# Patient Record
Sex: MALE | Race: WHITE | NOT HISPANIC OR LATINO | Employment: FULL TIME | URBAN - METROPOLITAN AREA
[De-identification: names, ages, dates, MRNs, and addresses within clinical notes are randomized per-mention and may not be internally consistent; named-entity substitution may affect disease eponyms.]

---

## 2018-10-18 ENCOUNTER — OFFICE VISIT CONVERTED (OUTPATIENT)
Dept: ORTHOPEDIC SURGERY | Facility: CLINIC | Age: 53
End: 2018-10-18
Attending: PHYSICIAN ASSISTANT

## 2018-11-01 ENCOUNTER — OFFICE VISIT CONVERTED (OUTPATIENT)
Dept: ORTHOPEDIC SURGERY | Facility: CLINIC | Age: 53
End: 2018-11-01
Attending: ORTHOPAEDIC SURGERY

## 2018-11-19 ENCOUNTER — OFFICE VISIT CONVERTED (OUTPATIENT)
Dept: ORTHOPEDIC SURGERY | Facility: CLINIC | Age: 53
End: 2018-11-19
Attending: PHYSICIAN ASSISTANT

## 2018-12-10 ENCOUNTER — OFFICE VISIT CONVERTED (OUTPATIENT)
Dept: ORTHOPEDIC SURGERY | Facility: CLINIC | Age: 53
End: 2018-12-10
Attending: PHYSICIAN ASSISTANT

## 2019-01-07 ENCOUNTER — OFFICE VISIT CONVERTED (OUTPATIENT)
Dept: ORTHOPEDIC SURGERY | Facility: CLINIC | Age: 54
End: 2019-01-07
Attending: PHYSICIAN ASSISTANT

## 2019-02-05 ENCOUNTER — OFFICE VISIT CONVERTED (OUTPATIENT)
Dept: ORTHOPEDIC SURGERY | Facility: CLINIC | Age: 54
End: 2019-02-05
Attending: ORTHOPAEDIC SURGERY

## 2019-03-27 ENCOUNTER — OFFICE VISIT CONVERTED (OUTPATIENT)
Dept: ORTHOPEDIC SURGERY | Facility: CLINIC | Age: 54
End: 2019-03-27
Attending: ORTHOPAEDIC SURGERY

## 2019-05-14 ENCOUNTER — OFFICE VISIT CONVERTED (OUTPATIENT)
Dept: ORTHOPEDIC SURGERY | Facility: CLINIC | Age: 54
End: 2019-05-14
Attending: ORTHOPAEDIC SURGERY

## 2020-01-27 ENCOUNTER — OFFICE VISIT CONVERTED (OUTPATIENT)
Dept: FAMILY MEDICINE CLINIC | Facility: CLINIC | Age: 55
End: 2020-01-27
Attending: PHYSICIAN ASSISTANT

## 2020-01-27 ENCOUNTER — HOSPITAL ENCOUNTER (OUTPATIENT)
Dept: GENERAL RADIOLOGY | Facility: HOSPITAL | Age: 55
Discharge: HOME | End: 2020-01-27
Attending: PHYSICIAN ASSISTANT

## 2020-01-27 ENCOUNTER — CONVERSION ENCOUNTER (OUTPATIENT)
Dept: FAMILY MEDICINE CLINIC | Facility: CLINIC | Age: 55
End: 2020-01-27

## 2020-02-05 ENCOUNTER — HOSPITAL ENCOUNTER (OUTPATIENT)
Dept: LAB | Facility: HOSPITAL | Age: 55
Discharge: HOME | End: 2020-02-05
Attending: PHYSICIAN ASSISTANT

## 2020-02-05 LAB
25(OH)D3 SERPL-MCNC: 23.1 NG/ML (ref 30–100)
ALBUMIN SERPL-MCNC: 4.4 G/DL (ref 3.5–5)
ALBUMIN/GLOB SERPL: 1.6 {RATIO} (ref 1.4–2.6)
ALP SERPL-CCNC: 45 U/L (ref 56–119)
ALT SERPL-CCNC: 23 U/L (ref 10–40)
ANION GAP SERPL CALC-SCNC: 21 MMOL/L (ref 8–19)
APPEARANCE UR: ABNORMAL
AST SERPL-CCNC: 25 U/L (ref 15–50)
BASOPHILS # BLD AUTO: 0.07 10*3/UL (ref 0–0.2)
BASOPHILS NFR BLD AUTO: 1.3 % (ref 0–3)
BILIRUB SERPL-MCNC: 0.58 MG/DL (ref 0.2–1.3)
BILIRUB UR QL: NEGATIVE
BUN SERPL-MCNC: 19 MG/DL (ref 5–25)
BUN/CREAT SERPL: 19 {RATIO} (ref 6–20)
CALCIUM SERPL-MCNC: 9.2 MG/DL (ref 8.7–10.4)
CHLORIDE SERPL-SCNC: 105 MMOL/L (ref 99–111)
CHOLEST SERPL-MCNC: 217 MG/DL (ref 107–200)
CHOLEST/HDLC SERPL: 4.1 {RATIO} (ref 3–6)
COLOR UR: YELLOW
CONV ABS IMM GRAN: 0.02 10*3/UL (ref 0–0.2)
CONV BACTERIA: NEGATIVE
CONV CO2: 20 MMOL/L (ref 22–32)
CONV COLLECTION SOURCE (UA): ABNORMAL
CONV IMMATURE GRAN: 0.4 % (ref 0–1.8)
CONV TOTAL PROTEIN: 7.1 G/DL (ref 6.3–8.2)
CONV UROBILINOGEN IN URINE BY AUTOMATED TEST STRIP: 1 {EHRLICHU}/DL (ref 0.1–1)
CREAT UR-MCNC: 1 MG/DL (ref 0.7–1.2)
DEPRECATED RDW RBC AUTO: 38.4 FL (ref 35.1–43.9)
EOSINOPHIL # BLD AUTO: 0.26 10*3/UL (ref 0–0.7)
EOSINOPHIL # BLD AUTO: 4.8 % (ref 0–7)
ERYTHROCYTE [DISTWIDTH] IN BLOOD BY AUTOMATED COUNT: 11.9 % (ref 11.6–14.4)
GFR SERPLBLD BASED ON 1.73 SQ M-ARVRAT: >60 ML/MIN/{1.73_M2}
GLOBULIN UR ELPH-MCNC: 2.7 G/DL (ref 2–3.5)
GLUCOSE SERPL-MCNC: 118 MG/DL (ref 70–99)
GLUCOSE UR QL: NEGATIVE MG/DL
HCT VFR BLD AUTO: 43.9 % (ref 42–52)
HDLC SERPL-MCNC: 53 MG/DL (ref 40–60)
HGB BLD-MCNC: 14.6 G/DL (ref 14–18)
HGB UR QL STRIP: NEGATIVE
KETONES UR QL STRIP: NEGATIVE MG/DL
LDLC SERPL CALC-MCNC: 146 MG/DL (ref 70–100)
LEUKOCYTE ESTERASE UR QL STRIP: NEGATIVE
LYMPHOCYTES # BLD AUTO: 1.23 10*3/UL (ref 1–5)
LYMPHOCYTES NFR BLD AUTO: 22.9 % (ref 20–45)
MCH RBC QN AUTO: 29.9 PG (ref 27–31)
MCHC RBC AUTO-ENTMCNC: 33.3 G/DL (ref 33–37)
MCV RBC AUTO: 89.8 FL (ref 80–96)
MONOCYTES # BLD AUTO: 0.47 10*3/UL (ref 0.2–1.2)
MONOCYTES NFR BLD AUTO: 8.7 % (ref 3–10)
NEUTROPHILS # BLD AUTO: 3.33 10*3/UL (ref 2–8)
NEUTROPHILS NFR BLD AUTO: 61.9 % (ref 30–85)
NITRITE UR QL STRIP: NEGATIVE
NRBC CBCN: 0 % (ref 0–0.7)
OSMOLALITY SERPL CALC.SUM OF ELEC: 297 MOSM/KG (ref 273–304)
PH UR STRIP.AUTO: 5.5 [PH] (ref 5–8)
PLATELET # BLD AUTO: 275 10*3/UL (ref 130–400)
PMV BLD AUTO: 10.4 FL (ref 9.4–12.4)
POTASSIUM SERPL-SCNC: 4.3 MMOL/L (ref 3.5–5.3)
PROT UR QL: NEGATIVE MG/DL
PSA SERPL-MCNC: 0.88 NG/ML (ref 0–4)
RBC # BLD AUTO: 4.89 10*6/UL (ref 4.7–6.1)
RBC #/AREA URNS HPF: ABNORMAL /[HPF]
SODIUM SERPL-SCNC: 142 MMOL/L (ref 135–147)
SP GR UR: 1.03 (ref 1–1.03)
T4 FREE SERPL-MCNC: 1.3 NG/DL (ref 0.9–1.8)
TRIGL SERPL-MCNC: 90 MG/DL (ref 40–150)
TSH SERPL-ACNC: 3.53 M[IU]/L (ref 0.27–4.2)
VLDLC SERPL-MCNC: 18 MG/DL (ref 5–37)
WBC # BLD AUTO: 5.38 10*3/UL (ref 4.8–10.8)
WBC #/AREA URNS HPF: ABNORMAL /[HPF]

## 2020-02-06 ENCOUNTER — OFFICE VISIT CONVERTED (OUTPATIENT)
Dept: ORTHOPEDIC SURGERY | Facility: CLINIC | Age: 55
End: 2020-02-06
Attending: ORTHOPAEDIC SURGERY

## 2020-02-06 LAB
CONV HEPATITIS C AB WITH REFLEX TO CONFIRMATION: <0.1 S/CO RATIO (ref 0–0.9)
CONV HEPATITIS COMMENT: NORMAL

## 2020-02-07 LAB
EST. AVERAGE GLUCOSE BLD GHB EST-MCNC: 117 MG/DL
HBA1C MFR BLD: 5.7 % (ref 3.5–5.7)

## 2020-02-18 ENCOUNTER — OFFICE VISIT CONVERTED (OUTPATIENT)
Dept: ORTHOPEDIC SURGERY | Facility: CLINIC | Age: 55
End: 2020-02-18
Attending: ORTHOPAEDIC SURGERY

## 2020-04-14 ENCOUNTER — OFFICE VISIT CONVERTED (OUTPATIENT)
Dept: ORTHOPEDIC SURGERY | Facility: CLINIC | Age: 55
End: 2020-04-14
Attending: ORTHOPAEDIC SURGERY

## 2020-04-20 ENCOUNTER — HOSPITAL ENCOUNTER (OUTPATIENT)
Dept: MRI IMAGING | Facility: HOSPITAL | Age: 55
Discharge: HOME | End: 2020-04-20
Attending: ORTHOPAEDIC SURGERY

## 2020-04-28 ENCOUNTER — OFFICE VISIT CONVERTED (OUTPATIENT)
Dept: ORTHOPEDIC SURGERY | Facility: CLINIC | Age: 55
End: 2020-04-28
Attending: ORTHOPAEDIC SURGERY

## 2020-06-08 ENCOUNTER — OFFICE VISIT CONVERTED (OUTPATIENT)
Dept: NEUROSURGERY | Facility: CLINIC | Age: 55
End: 2020-06-08
Attending: NEUROLOGICAL SURGERY

## 2020-09-10 ENCOUNTER — OFFICE VISIT CONVERTED (OUTPATIENT)
Dept: NEUROSURGERY | Facility: CLINIC | Age: 55
End: 2020-09-10
Attending: NEUROLOGICAL SURGERY

## 2020-10-06 ENCOUNTER — OFFICE VISIT CONVERTED (OUTPATIENT)
Dept: ORTHOPEDIC SURGERY | Facility: CLINIC | Age: 55
End: 2020-10-06
Attending: ORTHOPAEDIC SURGERY

## 2020-10-15 ENCOUNTER — CONVERSION ENCOUNTER (OUTPATIENT)
Dept: ORTHOPEDIC SURGERY | Facility: CLINIC | Age: 55
End: 2020-10-15

## 2020-10-15 ENCOUNTER — OFFICE VISIT CONVERTED (OUTPATIENT)
Dept: ORTHOPEDIC SURGERY | Facility: CLINIC | Age: 55
End: 2020-10-15
Attending: ORTHOPAEDIC SURGERY

## 2020-12-07 ENCOUNTER — TELEMEDICINE CONVERTED (OUTPATIENT)
Dept: FAMILY MEDICINE CLINIC | Facility: CLINIC | Age: 55
End: 2020-12-07
Attending: PHYSICIAN ASSISTANT

## 2020-12-07 ENCOUNTER — HOSPITAL ENCOUNTER (OUTPATIENT)
Dept: URGENT CARE | Facility: CLINIC | Age: 55
Discharge: HOME | End: 2020-12-07
Attending: PHYSICIAN ASSISTANT

## 2020-12-10 LAB — SARS-COV-2 RNA SPEC QL NAA+PROBE: NOT DETECTED

## 2021-01-08 ENCOUNTER — CONVERSION ENCOUNTER (OUTPATIENT)
Dept: ORTHOPEDIC SURGERY | Facility: CLINIC | Age: 56
End: 2021-01-08

## 2021-01-08 ENCOUNTER — OFFICE VISIT CONVERTED (OUTPATIENT)
Dept: ORTHOPEDIC SURGERY | Facility: CLINIC | Age: 56
End: 2021-01-08
Attending: ORTHOPAEDIC SURGERY

## 2021-04-13 ENCOUNTER — OFFICE VISIT CONVERTED (OUTPATIENT)
Dept: ORTHOPEDIC SURGERY | Facility: CLINIC | Age: 56
End: 2021-04-13
Attending: ORTHOPAEDIC SURGERY

## 2021-05-10 NOTE — H&P
History and Physical      Patient Name: Gerald Escalera   Patient ID: [id number]   Sex: Male   YOB: 1965    Primary Care Provider: Radames Price PA-C   Referring Provider: Dano Marquez MD    Visit Date: 2020    Provider: Johan Santiago MD   Location: [facility]   Location Address: [address] [place] [address]  [id number]   Location Phone: [phone]          Chief Complaint  · Neck and left arm pain      History Of Present Illness  The patient is a 54 year old /White male, who presents on referral from Dano Marquez MD, for a neurosurgical evaluation for a history of neck pain.   The neck pain is localized to the posterior cervical region and has been present for 2 months. It is moderate (3-6/10) in severity and radiates into the left C6 distribution. The pain is described as being intermittent and it is generally following no specific pattern. The patient states the pain is aggravated by head turning. He reports the pain is alleviated by raising arm over head, turning head towards right.   The onset of the symptoms was not associated with any specific event or activity.   He also reports constant left shoulder pain, Rotator cuff injury. The patient denies right arm weakness. The patient has no prior history of neck or back surgery.   RECENT INTERVENTIONS:  He reports undergoing steroid shots and NSAIDs. The steroid shots were helpful.   INFORMATION REVIEWED:  The following information was reviewed: radiology reports and radiographic images and the referring physician's notes. The MRI of the cervical spine revealed a herniated disc. The disc herniation is at C5-6 on the left.      Patient: GERALD ESCALERA  : 1965 Acct#: [id number] MRN: [id number]  Location:MRI Tech: RT JAYCEE  Ordered By: DANO MARQUEZ MD DOS: 2020  Cc Report : Exam Time: 8:22  PROCEDURE: MRI CERVICAL SPINE WITHOUT CONTRAST  COMPARISON:  None.  INDICATIONS: CERVICAL SPINE PAIN  TECHNIQUE: A variety of imaging planes and parameters were utilized for visualization of suspected  pathology.  FINDINGS:  There is straightening of the normal cervical lordosis. There is no evidence of subluxation. The bone marrow  signal appears within normal limits. There is no evidence of edema or marrow infiltrative process. The vertebral  body heights are maintained without evidence of fracture. There is mild disc height loss at C5-C6 and C6-C7.  The cervical spinal canal is congenitally narrow with maximal canal diameter of 11 mm. This accentuates the  effect of degenerative disc disease. There is no cord signal abnormality or evidence of myelomalacia. The  craniocervical junction appears unremarkable. The prevertebral soft tissues are normal in thickness. The  paraspinal musculature appears symmetric. Visualized portions of the thyroid appear unremarkable.  CERVICAL DISC LEVELS:  C2-C3: There is no disc bulge or superimposed thecal sac stenosis. There is left-sided uncovertebral and facet  arthropathy contributing to moderate left-sided neural foraminal narrowing. There is no right-sided  neural foraminal narrowing.  C3-C4: There is no disc bulge or superimposed thecal sac stenosis. There is mild bilateral uncovertebral  spurring contributing to mild left and no right-sided neural foraminal narrowing.  C4-C5: There is no disc bulge or superimposed thecal sac stenosis. There is left-sided facet arthropathy and  uncovertebral spurring contributing to moderate neural foraminal narrowing on the left. There is mild  uncovertebral spurring on the right contributing to mild right-sided neural foraminal narrowing.  C5-C6: There is a left paracentral posterior disc osteophyte complex which causes effacement of the ventral  aspect of the thecal sac and deformation of the left aspect of the cord. The dorsal CSF space is also  narrowed. Findings are compatible with moderate to  severe spinal canal stenosis. The canal diameter  measures 8 mm at the posterior disc osteophyte complex. There is left greater than right uncovertebral  spurring contributing to moderate to severe left and moderate right neural foraminal narrowing.  C6-C7: There is a broad-based posterior disc osteophyte complex which effaces the ventral thecal sac  compatible with mild to moderate spinal canal stenosis. The minimal canal diameter is 9 mm. There is  bilateral uncovertebral spurring which contributes to moderate right and mild left-sided neural foraminal  narrowing.  C7-T1: There is no disc bulge, superimposed thecal sac stenosis, or neural foraminal narrowing.  CONCLUSION:  1. Congenital cervical spinal canal stenosis which accentuates the effect of degenerative disc disease.  [address] [place] [address]  [phone] (Option 3)  Tel: [phone]  FAX: [phone]  CLICK HERE TO VIEW EXAM  Patient: ORLIN RAINES  : 1965 Acct#: [id number] MRN: [id number]  Location:MRI Tech: RT JAYCEE  Ordered By: DANO ARSHAD MD DOS: 2020  Cc Report : Exam Time: 8:22  2. Left paracentral posterior disc osteophyte complex at C5-C6 causing moderate to severe spinal canal  stenosis with localized cord deformation. Moderate to severe left-sided neural foraminal narrowing is also  present secondary to uncovertebral spurring.  3. Broad-based posterior disc osteophyte complex at C6-C7 causing mild-to-moderate spinal canal stenosis.  4. Lesser degrees of neural foraminal narrowing as detailed above.  TYE SIBLEY MD  Electronically Signed and Approved By: TYE SIBLEY MD on 2020 at 8:59       Past Medical History  AC joint arthropathy; Complete tear of right rotator cuff; Incomplete tear of right rotator cuff; Right shoulder pain; Subacromial impingement of right shoulder; Subacromial impingement, right; Superior glenoid labrum lesion of right  "shoulder, initial encounter         Past Surgical History  Cholecystecomy         Allergy List  NO KNOWN DRUG ALLERGIES         Family Medical History  Family history of stroke         Social History  Alcohol (Never); ; lives alone; ; No known infection risk; Tobacco (Never); Working         Review of Systems  · Constitutional  o Denies  o : chills, excessive sweating, fatigue, fever, sycope/passing out, weight gain, weight loss  · Eyes  o Denies  o : changes in vision, blurry vision, double vision  · HENT  o Denies  o : loss of hearing, ringing in the ears, ear aches, sore throat, nasal congestion, sinus pain, nose bleeds, seasonal allergies  · Cardiovascular  o Denies  o : blood clots, swollen legs, anemia, easy burising or bleeding, transfusions  · Respiratory  o Denies  o : shortness of breath, dry cough, productive cough, pneumonia, COPD  · Gastrointestinal  o Denies  o : difficulty swallowing, reflux  · Genitourinary  o Denies  o : incontinence  · Neurologic  o Admits  o : numbness/tingling/paresthesia   o Denies  o : headache, seizure, stroke, tremor, loss of balance, falls, dizziness/vertigo, difficulty with sleep, difficulty with coordination, difficulty with dexterity, weakness  · Musculoskeletal  o Admits  o : neck stiffness/pain, pain radiating in arm  o Denies  o : swollen lymph nodes, muscle aches, joint pain, weakness, spasms, sciatica, pain radiating in leg, low back pain  · Endocrine  o Denies  o : diabetes, thyroid disorder  · Psychiatric  o Denies  o : anxiety, depression      Vitals  Date Time BP Position Site L\R Cuff Size HR RR TEMP (F) WT  HT  BMI kg/m2 BSA m2 O2 Sat        06/08/2020 08:17 AM        98.1 294lbs 3oz 5'  11\" 41.03 2.59           Physical Examination  · Constitutional  o Appearance  o : well-nourished, well developed, alert, in no acute distress  · Neck  o Inspection/Palpation  o : normal appearance, no masses or tenderness, trachea midline  o Range of " Motion  o : cervical range of motion within normal limits  · Respiratory  o Respiratory Effort  o : breathing unlabored  · Cardiovascular  o Peripheral Vascular System  o :   § Extremities  § : no edema or cyanosis  · Musculoskeletal  o Spine  o :   § Stability  § : no subluxations present  § Muscle Strength/Tone  § : paraspinal muscle strength within normal limits, paraspinal muscle tone within normal limits  o Right Upper Extremity  o :   § Inspection/Palpation  § : no tenderness to palpation  § Joint Stability  § : shoulder, elbow and wrist joint stability normal  § Range of Motion  § : range of motion normal, no joint crepitus or pain with motion present,shoulder negative  o Left Upper Extremity  o :   § Inspection/Palpation  § : no tenderness to palpation  § Joint Stability  § : shoulder, elbow and wrist joint stability normal  § Range of Motion  § : range of motion normal, no joint crepitus present, positive pain with joint motion, shoulder positive empty can test  · Skin and Subcutaneous Tissue  o Neck  o : no lesions or areas of discoloration  · Neurologic  o Mental Status Examination  o :   § Orientation  § : alert and oriented to person, place, time and events  o Motor Examination  o :   § RUE Strength  § : strength normal  § RUE Motor Function  § : tone normal, muscle bulk normal  § LUE Strength  § : strength normal  § LUE Motor Function  § : tone normal, muscle bulk normal  o Reflexes  o :   § RUE  § : hypoactive in biceps/triceps/brachioradialis, Jarrett sign negative  § LUE  § : hypoactive in biceps/triceps/brachioradialis, Jarrett sign negative  o Sensation  o :   § Light Touch  § : sensation intact to light touch in extremities  o Gait and Station  o :   § Gait Screening  § : normal gait, able to stand without difficulty  · Psychiatric  o Mood and Affect  o : mood normal, affect appropriate     Negative Mack's           Assessment  · Cervicalgia     723.1/M54.2  · Cervical  radiculopathy     723.4/M54.12  · Herniated disc, C5-6     722.91  to left with radiculopathy  · Rotator cuff injury     959.2/S46.009A    Problems Reconciled  Plan  · Orders  o ACO-17: Screened for tobacco use AND received tobacco cessation intervention (4004F) - - 06/08/2020  o PHYSICAL THERAPY CONSULTATION (PHYTH) - 723.4/M54.12, 722.91 - 06/08/2020  · Medications  o Medications have been Reconciled  o Transition of Care or Provider Policy  · Instructions  o Tobacco Cessation Counseling: non-user  o Encouraged to follow-up with Primary Care Provider for preventative care.  o The ROS and the PFSH were reviewed at today's visit.  o Physical Therapy: We have discussed the role of physical therapy. I explained that almost 2/3 of people will have a favorable response to physical therapy. Multiple courses of physical therapy do not tend to provide any additional benefit. In the absence of spinal cord compression or neurologic deficit physical therapy is both safe and effective in treating cervical disc disease.  o Surgery: I described the surgical procedure in detail and advised the patient of the risks of infection, the risk of bleeding requiring a blood transfusion, and intraoperative nerve damage that could result in persistent pain, weakness, numbness, bowel or bladder dysfunction, sexual dysfunction and the remote risk of death. I also explained the factors that tend to inhibit the ability of the bones to fuse including smoking and NSAID use.  o Should fusion not occur the patient understands that additional surgical procedures may be required. I also explained that almost half of patients who undergo an anterior procedure experience transient dysphagia and hoarseness. In some cases this can be permanent. With a posterior procedure there is risk of injuring the blood vessels that supply the brain and that damage to these arteries could cause a stroke that could result in death.  o I have discussed the risks and  benefits of surgery versus physical therapy and other conservative forms of treatment.  o Handouts provided: Cervical Exercises.  o Call or return if symptoms worsen or persist.  · Disposition  o Call or Return if symptoms worsen or persist.  o follow up 1 month            Electronically Signed by: Johan Santiago MD -Author on June 8, 2020 08:56:43 AM

## 2021-05-12 NOTE — PROGRESS NOTES
Progress Note      Patient Name: Gerald Escalera   Patient ID: [id number]   Sex: Male   YOB: 1965    Primary Care Provider: Radames Price PA-C   Referring Provider: Radames Price PA-C    Visit Date: April 28, 2020    Provider: Jorgito Marquez MD   Location: [facility]   Location Address: [address] [place] [address]  [id number]   Location Phone: [phone]          Chief Complaint  · Left Shoulder Pain - MRI Results      History Of Present Illness  Gerald Escalera is a 54 year old /White male who presents today to [facility]. Patient presents for follow up evaluation of neck pain and left shoulder pain and to review MRI results. Patient reports numbness and tingling in the left forearm distal to the radial side of the hand for the past few months. Patient states the injection he received at last visit has helped his pain. He denies new injury.       Past Medical History  AC joint arthropathy; Complete tear of right rotator cuff; Incomplete tear of right rotator cuff; Right shoulder pain; Subacromial impingement of right shoulder; Subacromial impingement, right; Superior glenoid labrum lesion of right shoulder, initial encounter         Past Surgical History  Cholecystecomy         Medication List  Vitamin D2 50,000 unit oral capsule         Allergy List  NO KNOWN DRUG ALLERGIES       Allergies Reconciled  Family Medical History  *No Known Family History         Social History  Alcohol (Never); ; No known infection risk; Tobacco (Never)         Review of Systems  · Constitutional  o Denies  o : fever, chills, weight loss  · Cardiovascular  o Denies  o : chest pain, shortness of breath  · Gastrointestinal  o Denies  o : liver disease, heartburn, nausea, blood in stools  · Genitourinary  o Denies  o : painful urination, blood in urine  · Integument  o Denies  o : rash, itching  · Neurologic  o Denies  o : headache, weakness, loss of  "consciousness  · Musculoskeletal  o Denies  o : painful, swollen joints  · Psychiatric  o Denies  o : drug/alcohol addiction, anxiety, depression      Vitals  Date Time BP Position Site L\R Cuff Size HR RR TEMP (F) WT  HT  BMI kg/m2 BSA m2 O2 Sat HC       04/28/2020 02:08 PM      66 - R   285lbs 0oz 5'  11\" 39.75 2.54 96 %          Physical Examination  · Constitutional  o Appearance  o : well developed, well-nourished, no obvious deformities present  · Head and Face  o Head  o :   § Inspection  § : normocephalic  o Face  o :   § Inspection  § : no facial lesions  · Eyes  o Conjunctivae  o : conjunctivae normal  o Sclerae  o : sclerae white  · Ears, Nose, Mouth and Throat  o Ears  o :   § External Ears  § : appearance within normal limits  § Hearing  § : intact  o Nose  o :   § External Nose  § : appearance normal  · Neck  o Inspection/Palpation  o : normal appearance  o Range of Motion  o : full range of motion  · Respiratory  o Respiratory Effort  o : breathing unlabored  o Inspection of Chest  o : normal appearance  o Auscultation of Lungs  o : no audible wheezing or rales  · Cardiovascular  o Heart  o : regular rate  · Gastrointestinal  o Abdominal Examination  o : soft and non-tender  · Skin and Subcutaneous Tissue  o General Inspection  o : intact, no rashes  · Psychiatric  o General  o : Alert and oriented x3  o Judgement and Insight  o : judgment and insight intact  o Mood and Affect  o : mood normal, affect appropriate  · Left Shoulder  o Inspection  o : Negative Byrne, Neurovascularly intact, sensation intact, Non-tender to palpation, no swelling or atrophy, forward elevation 180, abduction 175, ER 90 and IR 70 degrees, IR to T12, 4+ supraspinatus, 5/5 infraspinatus and subscapularis, pain with Farah, negative biceps testing   · Imaging  o Imaging  o : MRI Cervical Spine without contrast, 4/20/20, CONCLUSION: 1. Congenital cervical spinal canal stenosis which accentuates the effect of degenerative disc " disease.2. Left paracentral posterior disc osteophyte complex at C5-C6 causing moderate to severe spinal canal stenosis with localized cord deformation. Moderate to severe left-sided neural foraminal narrowing is also present secondary to uncovertebral spurring. 3. Broad-based posterior disc osteophyte complex at C6-C7 causing mild-to-moderate spinal canal stenosis. 4. Lesser degrees of neural foraminal narrowing as detailed above.MRI Left Hip with contrast, 4/20/20,CONCLUSION: 1. Focal full-thickness 6 mm wide tear of the distal supraspinatus tendon at the footplate with moderate superimposed tendinopathy.2. Mild to moderate anterior infraspinatus and subscapularis tendinopathy. 3. Likely degenerative SLAP tear. 4. Mild age-appropriate DJD at the AC joint. [facility] XR 1/2020: Mild degenerative change. No acute osseous abnormalities are identified. . MRI left shoulder without contrast, 2/13/2020,IMPRESSION: 1. Mild to moderate supraspinatus and infraspinatus tendinopathy. Small partial-thickness. bursal surface tear of the posterior insertional supraspinatus tendon, involving less than 50% thickness. 2. No significant labral pathology. 3. Mild AC joint arthrosis without associated outlet impingement. 4. Mild inflammation of the subacromial/subdeltoid bursa.           Assessment  · Left shoulder pain, unspecified chronicity     719.41/M25.512  · Left Rotator cuff tear     840.4/M75.100  · Stenosis, cervical spine     723.0/M48.02      Plan  · Medications  o Medications have been Reconciled  o Transition of Care or Provider Policy  · Instructions  o Reviewed the patient's Past Medical, Social, and Family history as well as the ROS at today's visit, no changes.  o Call or return if worsening symptoms.  o The above service was scribed by Sg Monroy PA-C on my behalf and I attest to the accuracy of the note. jsb  o Reviewed MRIs with patient and discussed diagnosis and treatment plan. Left shoulder: Discussed with patient  treatment options including surgical intervention for rotator cuff repair with biceps tenodesis, risks benefits procedure and recovery were discussed with the patient. Patient advised that he should begin consultation with neurosurgery prior to pursuing surgery for shoulder repair. Cervical spine: Discussed MRI with the patient and advised him that he requires consultation with neurosurgery for further evaluation and patient is to follow-up for shoulder discussion/possible surgery after neurosurgery consult.  o Electronically Identified Patient Education Materials Provided Electronically            Electronically Signed by: Junior Monroy PA-C -Author on April 28, 2020 07:39:47 PM  Electronically Co-signed by: Jorgito Marquez MD -Reviewer on April 28, 2020 10:29:03 PM

## 2021-05-12 NOTE — PROGRESS NOTES
Progress Note      Patient Name: Gerald Escalera   Patient ID: [id number]   Sex: Male   YOB: 1965    Primary Care Provider: Radames Price PA-C   Referring Provider: Radames Price PA-C    Visit Date: April 14, 2020    Provider: Jorgito Marquez MD   Location: [facility]   Location Address: [address] [place] [address]  [id number]   Location Phone: [phone]          Chief Complaint  · left shoulder pain       History Of Present Illness  Gerald Escalera is a 54 year old /White male who presents today to [facility].      Patient presents for follow-up evaluation of left shoulder pain. Patient was last seen in February of 2020. Patient received biceps injection at last visit and only helped for about a week. Patient has stopped going to the gym and states the shoulder exercises are helping.  Patient reports numbness and tingling in the forearm distal to the radial side of the hand. There is tingling of the hand in this region. Patient has no new injury or trauma. Pain is worse with  pronation and supination with lifting. History of previous SLAP repair/ tendonitis on the right shoulder.       Past Medical History  AC joint arthropathy; Complete tear of right rotator cuff; Incomplete tear of right rotator cuff; Right shoulder pain; Subacromial impingement of right shoulder; Subacromial impingement, right; Superior glenoid labrum lesion of right shoulder, initial encounter         Past Surgical History  Cholecystecomy         Medication List  Vitamin D2 50,000 unit oral capsule         Allergy List  NO KNOWN DRUG ALLERGIES         Family Medical History  *No Known Family History         Social History  Alcohol (Never); ; No known infection risk; Tobacco (Never)         Review of Systems  · Constitutional  o Denies  o : fever, chills, weight loss  · Cardiovascular  o Denies  o : chest pain, shortness of breath  · Gastrointestinal  o Denies  o : liver disease,  "heartburn, nausea, blood in stools  · Genitourinary  o Denies  o : painful urination, blood in urine  · Integument  o Denies  o : rash, itching  · Neurologic  o Denies  o : headache, weakness, loss of consciousness  · Musculoskeletal  o Denies  o : painful, swollen joints  · Psychiatric  o Denies  o : drug/alcohol addiction, anxiety, depression      Vitals  Date Time BP Position Site L\R Cuff Size HR RR TEMP (F) WT  HT  BMI kg/m2 BSA m2 O2 Sat        04/14/2020 10:44 AM      74 - R   285lbs 0oz 5'  11\" 39.75 2.54 98 %          Physical Examination  · Constitutional  o Appearance  o : well developed, well-nourished, no obvious deformities present  · Head and Face  o Head  o :   § Inspection  § : normocephalic  o Face  o :   § Inspection  § : no facial lesions  · Eyes  o Conjunctivae  o : conjunctivae normal  o Sclerae  o : sclerae white  · Ears, Nose, Mouth and Throat  o Ears  o :   § External Ears  § : appearance within normal limits  § Hearing  § : intact  o Nose  o :   § External Nose  § : appearance normal  · Neck  o Inspection/Palpation  o : normal appearance  o Range of Motion  o : full range of motion  · Respiratory  o Respiratory Effort  o : breathing unlabored  o Inspection of Chest  o : normal appearance  o Auscultation of Lungs  o : no audible wheezing or rales  · Cardiovascular  o Heart  o : regular rate  · Gastrointestinal  o Abdominal Examination  o : soft and non-tender  · Skin and Subcutaneous Tissue  o General Inspection  o : intact, no rashes  · Psychiatric  o General  o : Alert and oriented x3  o Judgement and Insight  o : judgment and insight intact  o Mood and Affect  o : mood normal, affect appropriate  · Cervical Spine  o Inspection  o : Reduced range of motion  · Left Shoulder  o Inspection  o : Negative Byrne, Neurovascularly intact, sensation intact, Non-tender to palpation, no swelling or atrophy, forward elevation 180, abduction 175, ER 90 and IR 70 degrees, IR to T12, 4+ " supraspinatus, 5/5 infraspinatus and subscapularis, pain with Farah, negative biceps testing.   · Injection Note/Aspiration Note  o Site  o : left shoulder  o Procedure  o : Procedure: After educating the patient, patient gave consent for procedure. After using Chloraprep, the joint space was injected. The patient tolerated the procedure well.  o Medication  o : 80 mg of DepoMedrol with 4cc of 1% Lidocaine          Assessment  · Left shoulder pain, unspecified chronicity     719.41/M25.512  · SLAP tear of shoulder     840.7/S43.439A  · Tendinitis     726.90/M77.9      Plan  · Orders  o Depo-Medrol injection 80mg () - - 04/14/2020   Lot [id number] Exp 04 2021 [employer] Administered by DANO ARSHAD MD   o Shoulder Intra-articular Injection without US [facility] ([id number]) - - 04/14/2020   Lot [id number] Exp 07 01 2021 [employer] Administered by DANO ARSAHD MD   · Medications  o Medications have been Reconciled  o Transition of Care or Provider Policy  · Instructions  o Reviewed the patient's Past Medical, Social, and Family history as well as the ROS at today's visit, no changes.  o Call or return if worsening symptoms.  o This note was transcribed by Binh Rose. trudy.  o Discussed treatment options with patient. Recommend further evaluation of SLAP tear of left shoulder with MRI arthrogram. Also recommending MRI of cervical spine to rule out ridicuothropy cervical DJD. He received a left shoulder biceps tendon injection today. Patient will follow up after MRI. Discussed possible surgery of the left shoulder.            Electronically Signed by: Colin Rose - , Other -Author on April 15, 2020 11:11:31 AM  Electronically Co-signed by: Dano Arshad MD -Reviewer on April 16, 2020 09:22:21 PM

## 2021-05-13 NOTE — PROGRESS NOTES
Progress Note      Patient Name: Gerald Escalera   Patient ID: [id number]   Sex: Male   YOB: 1965    Primary Care Provider: Radames Price PA-C   Referring Provider: Jorgito Marquez MD    Visit Date: October 6, 2020    Provider: Jorgito Marquez MD   Location: [facility]   Location Address: [address] [place] [address]  [id number]   Location Phone: [phone]          Chief Complaint  · Left shoulder pain      History Of Present Illness  Gerald Escalera is a 55 year old /White male who presents today to [facility].      The patient presents here today for follow up evaluation of left shoulder pain.  The patient previously was seen and sent to neurosurgery for the numbness in his arm and was sent to physical therapy.  The patient is here today still reporting pain in his shoulder that wakes him up at night. He locates the pain to front of the shoulder.             Past Medical History  AC joint arthropathy; Cervical radiculopathy; Cervicalgia; Complete tear of right rotator cuff; Herniated disc, C5-6; Incomplete tear of right rotator cuff; Right shoulder pain; Rotator cuff injury; Subacromial impingement of right shoulder; Subacromial impingement, right; Superior glenoid labrum lesion of right shoulder, initial encounter         Past Surgical History  Cholecystecomy         Allergy List  NO KNOWN DRUG ALLERGIES       Allergies Reconciled  Family Medical History  Family history of stroke         Social History  Alcohol (Never); ; lives alone; ; No known infection risk; Tobacco (Never); Working         Review of Systems  · Constitutional  o Denies  o : fever, chills, weight loss  · Cardiovascular  o Denies  o : chest pain, shortness of breath  · Gastrointestinal  o Denies  o : liver disease, heartburn, nausea, blood in stools  · Genitourinary  o Denies  o : painful urination, blood in urine  · Integument  o Denies  o : rash,  "itching  · Neurologic  o Denies  o : headache, weakness, loss of consciousness  · Musculoskeletal  o Denies  o : painful, swollen joints  · Psychiatric  o Denies  o : drug/alcohol addiction, anxiety, depression      Vitals  Date Time BP Position Site L\R Cuff Size HR RR TEMP (F) WT  HT  BMI kg/m2 BSA m2 O2 Sat FR L/min FiO2 HC       10/06/2020 01:51 PM         290lbs 0oz 5'  11\" 40.45 2.57             Physical Examination  · Constitutional  o Appearance  o : well developed, well-nourished, no obvious deformities present  · Head and Face  o Head  o :   § Inspection  § : normocephalic  o Face  o :   § Inspection  § : no facial lesions  · Eyes  o Conjunctivae  o : conjunctivae normal  o Sclerae  o : sclerae white  · Ears, Nose, Mouth and Throat  o Ears  o :   § External Ears  § : appearance within normal limits  § Hearing  § : intact  o Nose  o :   § External Nose  § : appearance normal  · Neck  o Inspection/Palpation  o : normal appearance  o Range of Motion  o : full range of motion  · Respiratory  o Respiratory Effort  o : breathing unlabored  o Inspection of Chest  o : normal appearance  o Auscultation of Lungs  o : no audible wheezing or rales  · Cardiovascular  o Heart  o : regular rate  · Gastrointestinal  o Abdominal Examination  o : soft and non-tender  · Skin and Subcutaneous Tissue  o General Inspection  o : intact, no rashes  · Psychiatric  o General  o : Alert and oriented x3  o Judgement and Insight  o : judgment and insight intact  o Mood and Affect  o : mood normal, affect appropriate  · Left Shoulder  o Inspection  o : Elevation 180; Abduction 170; ER 90; IR 80. 4+ Supraspinatus strength. 5/5 infrared subscap. Pain with cross arm abduction. Negative impingement signs.   · Injection Note/Aspiration Note  o Site  o : Left shoulder  o Procedure  o : Procedure: After educating the patient, patient gave consent for procedure. After using Chloraprep, the joint space was injected. The patient tolerated the " procedure well.  o Medication  o : 80 mg of DepoMedrol with 9cc of 1% Lidocaine  · Imaging  o Imaging  o : MRI Arthrogram [facility] 04/2020: 1. Focal full-thickness 6 mm wide tear of the distal supraspinatus tendon at the footplate with moderate superimposed tendinopathy. 2. Mild to moderate anterior infraspinatus and subscapularis tendinopathy. 3. Likely degenerative SLAP tear. 4. Mild age-appropriate DJD at the AC joint.           Assessment  · Left shoulder pain, unspecified chronicity     719.41/M25.512  · Rotator cuff tear, left     840.4/M75.102      Plan  · Orders  o Depo-Medrol injection 80mg () - - 10/07/2020   Lot [id number] Exp 08 01 2021 [employer] Administered by DANO ARSHAD MD  o Shoulder Intra-articular Injection without US [facility] ([id number]) - - 10/07/2020   Lot [id number] Exp 08 01 2021 [employer] Administered by DANO ARSHAD MD  · Medications  o Medications have been Reconciled  o Transition of Care or Provider Policy  · Instructions  o Reviewed the patient's Past Medical, Social, and Family history as well as the ROS at today's visit, no changes.  o Call or return if worsening symptoms.  o Follow up after MRI.  o The above service was scribed by Petra Edmonds on my behalf and I attest to the accuracy of the note. jsb  o Discussed treatment options with the patient. I recommend a steroid injection today and a repeat MRI to compare. Discussed the risks and benefits of a steroid injection. The patient expressed understanding and wished to proceed with a steroid injection today. Follow up after MRI for results.  o Electronically Identified Patient Education Materials Provided Electronically            Electronically Signed by: Petra Edmonds MA -Author on October 7, 2020 08:44:00 AM  Electronically Co-signed by: Dnao Arshad MD -Reviewer on October 8, 2020 08:52:25 PM

## 2021-05-13 NOTE — PROGRESS NOTES
Progress Note      Patient Name: Gerald Escalera   Patient ID: [id number]   Sex: Male   YOB: 1965    Primary Care Provider: Radames Price PA-C   Referring Provider: Jorgito Marquez MD    Visit Date: December 7, 2020    Provider: Radames Price PA-C   Location: [facility]   Location Address: [address] [place] [address], [address]  [place], [place]  [id number]   Location Phone: [phone]          Chief Complaint  · Body aches  · Fever  · Chills  · Ear ache      History Of Present Illness  Video Conferencing Visit  Gerald Escalera is a 55 year old /White male who is presenting for evaluation via video conferencing via [employer]. Verbal consent obtained before beginning visit.   The following staff were present during this visit: Kyle Nunn MA/Radames Price PA-C   Gerald Escalera is a 55 year old /White male who presents for evaluation and treatment of: body aches, fever, chills, ear ache      Pt c/o body aches, chills, ear ache since Saturday.     Pt notes he has a fever of 100.0.     Pt stated he has been taking Advil and Motrin.     Pt denies any cough, soa, loss of taste or smell, or exposure to COVID-19.    No loss of taste or smell           Past Medical History  Disease Name Date Onset Notes   AC joint arthropathy 11/07/2018 --    Cervical radiculopathy 09/10/2020 --    Cervicalgia 09/10/2020 --    Complete tear of right rotator cuff 11/20/2018 --    Herniated disc, C5-6 09/10/2020 to left with radiculopathy   Incomplete tear of right rotator cuff 11/07/2018 --    Right shoulder pain --  --    Rotator cuff injury 09/10/2020 --    Subacromial impingement of right shoulder 11/07/2018 --    Subacromial impingement, right 11/20/2018 --    Superior glenoid labrum lesion of right shoulder, initial encounter 11/07/2018 --          Past Surgical History  Procedure Name Date Notes   Cholecystecomy --  --          Allergy List  Allergen Name Date Reaction Notes   NO KNOWN  DRUG ALLERGIES --  --  --        Allergies Reconciled  Family Medical History  Disease Name Relative/Age Notes   Family history of stroke Mother/   Mother         Social History  Finding Status Start/Stop Quantity Notes   Alcohol Never --/-- --  does not drink    --  --/-- --  --    lives alone --  --/-- --  --     --  --/-- --  --    No known infection risk --  --/-- --  --    Tobacco Never --/-- --  never smoker   Working --  --/-- --  --          Immunizations  NameDate Admin Mfg Trade Name Lot Number Route Inj VIS Given VIS Publication   Godffndnv44/22/2020 [facility] Fl[employer]one Quadrivalent [id number] IM RD 10/22/2020 08/15/2019   Comments: pt tolerated well         Review of Systems  · Constitutional  o Admits  o : fever  o Denies  o : fatigue, weight loss, weight gain  · Cardiovascular  o Denies  o : lower extremity edema, claudication, chest pressure, palpitations  · Respiratory  o Denies  o : shortness of breath, wheezing, cough, hemoptysis, dyspnea on exertion  · Gastrointestinal  o Denies  o : nausea, vomiting, diarrhea, constipation, abdominal pain      Physical Examination  · Constitutional  o Appearance  o : overweight, well developed, alert, in no acute distress  · Head and Face  o Head  o :   § Inspection  § : atraumatic, normocephalic  · Respiratory  o Respiratory Effort  o : breathing comfortably, no cough  · Skin and Subcutaneous Tissue  o General Inspection  o : no visible rash, no lesions  · Neurologic  o Mental Status Examination  o :   § Orientation  § : grossly oriented to person, place and time, no facial droop          Assessment  · Upper respiratory infection     465.9/J06.9  · Fever     780.60/R50.9  · Chills     780.64/R68.83  · Ear ache     388.70/H92.09  · Body aches     780.96/R52      Plan  · Orders  o ACO-39: Current medications updated and reviewed (1159F, ) - - 12/07/2020  o [facility] NCOV2 (send-out) (01613) - 780.60/R50.9, 780.96/R52 - 12/07/2020   4;00 PM  today   · Medications  o Zithromax Z-Donnie 250 mg oral tablet   SIG: take 2 tablets (500 mg) by oral route once daily for 1 day then 1 tablet (250 mg) by oral route once daily for 4 days   DISP: (6) Tablet with 0 refills  Prescribed on 12/07/2020     o Medications have been Reconciled  o Transition of Care or Provider Policy  · Instructions  o Rest. Increase Fluids.  o Patient was educated/instructed on their diagnosis, treatment and medications prior to discharge from the clinic today.  o Discussed Covid-19 precautions including, but not limited to, social distancing, avoid touching your face, and hand washing.   · Disposition  o Call or Return if symptoms worsen or persist.  o Care Transition            Electronically Signed by: Radames Price PA-C -Author on December 7, 2020 04:05:59 PM

## 2021-05-13 NOTE — PROGRESS NOTES
Progress Note      Patient Name: Gerald Escalera   Patient ID: [id number]   Sex: Male   YOB: 1965    Primary Care Provider: Radames Price PA-C   Referring Provider: Jorgito Marquez MD    Visit Date: October 15, 2020    Provider: Jorgito Marquez MD   Location: [facility]   Location Address: [address] [place] [address]  [id number]   Location Phone: [phone]          Chief Complaint  · Left shoulder pain      History Of Present Illness  Gerald Escalera is a 55 year old /White male who presents today to [facility].      The patient presents here today for follow up evaluation of his left shoulder. He has been reporting pain to the left shoulder. He states it wakes him up at night. I previously ordered an MRI to compare to his last MRI. I gave him and injection in his shoulder last visit to compare.               Past Medical History  AC joint arthropathy; Cervical radiculopathy; Cervicalgia; Complete tear of right rotator cuff; Herniated disc, C5-6; Incomplete tear of right rotator cuff; Right shoulder pain; Rotator cuff injury; Subacromial impingement of right shoulder; Subacromial impingement, right; Superior glenoid labrum lesion of right shoulder, initial encounter         Past Surgical History  Cholecystecomy         Allergy List  NO KNOWN DRUG ALLERGIES         Family Medical History  Family history of stroke         Social History  Alcohol (Never); ; lives alone; ; No known infection risk; Tobacco (Never); Working         Review of Systems  · Constitutional  o Denies  o : fever, chills, weight loss  · Cardiovascular  o Denies  o : chest pain, shortness of breath  · Gastrointestinal  o Denies  o : liver disease, heartburn, nausea, blood in stools  · Genitourinary  o Denies  o : painful urination, blood in urine  · Integument  o Denies  o : rash, itching  · Neurologic  o Denies  o : headache, weakness, loss of  "consciousness  · Musculoskeletal  o Denies  o : painful, swollen joints  · Psychiatric  o Denies  o : drug/alcohol addiction, anxiety, depression      Vitals  Date Time BP Position Site L\R Cuff Size HR RR TEMP (F) WT  HT  BMI kg/m2 BSA m2 O2 Sat FR L/min FiO2 HC       10/15/2020 04:05 PM         286lbs 0oz 5'  11\" 39.89 2.55             Physical Examination  · Constitutional  o Appearance  o : well developed, well-nourished, no obvious deformities present  · Head and Face  o Head  o :   § Inspection  § : normocephalic  o Face  o :   § Inspection  § : no facial lesions  · Eyes  o Conjunctivae  o : conjunctivae normal  o Sclerae  o : sclerae white  · Ears, Nose, Mouth and Throat  o Ears  o :   § External Ears  § : appearance within normal limits  § Hearing  § : intact  o Nose  o :   § External Nose  § : appearance normal  · Neck  o Inspection/Palpation  o : normal appearance  o Range of Motion  o : full range of motion  · Respiratory  o Respiratory Effort  o : breathing unlabored  o Inspection of Chest  o : normal appearance  o Auscultation of Lungs  o : no audible wheezing or rales  · Cardiovascular  o Heart  o : regular rate  · Gastrointestinal  o Abdominal Examination  o : soft and non-tender  · Skin and Subcutaneous Tissue  o General Inspection  o : intact, no rashes  · Psychiatric  o General  o : Alert and oriented x3  o Judgement and Insight  o : judgment and insight intact  o Mood and Affect  o : mood normal, affect appropriate  · Left Shoulder  o Inspection  o : Elevation 180; Abduction 170; ER 90; IR 80. 4+ Supraspinatus strength. 5/5 infrared subscap. Pain with cross arm abduction. Negative impingement signs.   · Imaging  o Imaging  o : MRI Shoulder wo contrast at [facility] on 10/12/20 reveals: 1. Overall no significant interval change from the prior left shoulder MRI performed 2/13/20. There is mild to moderate diffuse supraspinatus and infraspinatus tendinopathy with a persistent partial thickness " interstitial tear or the posterior insertional supraspinatus tendon. This involves less than 50% of the tendon thickness. No high-grade partial-thickness or full-thickness rotator cuff tear. 2. No significant labral pathology. 3. Mild AC joint arthrosis without evidence of significant outlet impingement. 4. Mild inflammation of the subacromial/subdeltoid bursa.          Assessment  · Left shoulder pain, unspecified chronicity     719.41/M25.512      Plan  · Medications  o Medications have been Reconciled  o Transition of Care or Provider Policy  · Instructions  o Reviewed the patient's Past Medical, Social, and Family history as well as the ROS at today's visit, no changes.  o Call or return if worsening symptoms.  o Follow up in 3 months.  o The above service was scribed by Petra Edmonds on my behalf and I attest to the accuracy of the note. jsb            Electronically Signed by: Kristel Ayala, -Author on October 27, 2020 04:23:05 PM  Electronically Co-signed by: Jorgito Marquez MD -Reviewer on October 28, 2020 10:07:52 PM

## 2021-05-13 NOTE — PROGRESS NOTES
Progress Note      Patient Name: Gerald Escalera   Patient ID: [id number]   Sex: Male   YOB: 1965    Primary Care Provider: Radames Price PA-C   Referring Provider: Jorgito Marquez MD    Visit Date: September 10, 2020    Provider: Layo Kirkpatrick MD   Location: [facility]   Location Address: [address] [place] [address]  [id number]   Location Phone: [phone]          Chief Complaint  · Follow-up     Here with complaints of neck and left shoulder pain.       History Of Present Illness  The patient is a 54 year old /White male who is in the office for followup appointment. The left C6 pain/numbness has only occurred a couple of times in the last few months.       Past Medical History  AC joint arthropathy; Cervical radiculopathy; Cervicalgia; Complete tear of right rotator cuff; Herniated disc, C5-6; Incomplete tear of right rotator cuff; Right shoulder pain; Rotator cuff injury; Subacromial impingement of right shoulder; Subacromial impingement, right; Superior glenoid labrum lesion of right shoulder, initial encounter         Past Surgical History  Cholecystecomy         Allergy List  NO KNOWN DRUG ALLERGIES       Allergies Reconciled  Family Medical History  Family history of stroke         Social History  Alcohol (Never); ; lives alone; ; No known infection risk; Tobacco (Never); Working         Review of Systems  · Constitutional  o Denies  o : chills, excessive sweating, fatigue, fever, sycope/passing out, weight gain, weight loss  · Eyes  o Denies  o : changes in vision, blurry vision, double vision  · HENT  o Admits  o : seasonal allergies  o Denies  o : loss of hearing, ringing in the ears, ear aches, sore throat, nasal congestion, sinus pain, nose bleeds  · Cardiovascular  o Denies  o : blood clots, swollen legs, anemia, easy burising or bleeding, transfusions  · Respiratory  o Denies  o : shortness of breath, dry cough,  "productive cough, pneumonia, COPD  · Gastrointestinal  o Denies  o : difficulty swallowing, reflux  · Genitourinary  o Denies  o : incontinence  · Neurologic  o Denies  o : headache, seizure, stroke, tremor, loss of balance, falls, dizziness/vertigo, difficulty with sleep, numbness/tingling/paresthesia , difficulty with coordination, difficulty with dexterity, weakness  · Musculoskeletal  o Admits  o : neck stiffness/pain, joint pain, pain radiating in arm  o Denies  o : swollen lymph nodes, muscle aches, weakness, spasms, sciatica, pain radiating in leg, low back pain  · Endocrine  o Denies  o : diabetes, thyroid disorder  · Psychiatric  o Denies  o : anxiety, depression  · All Others Negative      Vitals  Date Time BP Position Site L\R Cuff Size HR RR TEMP (F) WT  HT  BMI kg/m2 BSA m2 O2 Sat HC       09/10/2020 10:17 AM        97.1 295lbs 9oz 5'  11\" 41.22 2.59           Physical Examination  · Constitutional  o Appearance  o : well-nourished, well developed, alert, in no acute distress  · Respiratory  o Respiratory Effort  o : breathing unlabored  · Cardiovascular  o Peripheral Vascular System  o :   § Extremities  § : no cyanosis, clubbing or edema  · Psychiatric  o Mood and Affect  o : mood normal, affect appropriate              Assessment  · Cervicalgia     723.1/M54.2  · Cervical radiculopathy     723.4/M54.12  · Herniated disc, C5-6     722.91  to left with radiculopathy  · Rotator cuff injury     959.2/S46.009A      Plan  · Medications  o Medications have been Reconciled  o Transition of Care or Provider Policy  · Instructions  o Call or return to office if symptoms worsen or persist.   o He will transition to a home program of P.T.  o He will consider an over the door traction device with 10-12 lbs traction.             Electronically Signed by: Layo Kirkpatrick MD -Author on September 10, 2020 11:23:15 AM  "

## 2021-05-14 VITALS — BODY MASS INDEX: 41.38 KG/M2 | TEMPERATURE: 97.1 F | WEIGHT: 295.56 LBS | HEIGHT: 71 IN

## 2021-05-14 VITALS — BODY MASS INDEX: 40.04 KG/M2 | HEIGHT: 71 IN | WEIGHT: 286 LBS

## 2021-05-14 VITALS — HEART RATE: 90 BPM | WEIGHT: 303.5 LBS | OXYGEN SATURATION: 96 % | HEIGHT: 71 IN | BODY MASS INDEX: 42.49 KG/M2

## 2021-05-14 VITALS — HEIGHT: 71 IN | BODY MASS INDEX: 39.9 KG/M2 | WEIGHT: 285 LBS

## 2021-05-14 VITALS — HEIGHT: 71 IN | WEIGHT: 290 LBS | BODY MASS INDEX: 40.6 KG/M2

## 2021-05-14 NOTE — PROGRESS NOTES
Progress Note      Patient Name: Gerald Escalera   Patient ID: [id number]   Sex: Male   YOB: 1965    Primary Care Provider: Radames Price PA-C   Referring Provider: Jorgito Marquez MD    Visit Date: January 8, 2021    Provider: Jorgito Marquez MD   Location: [facility]   Location Address: [address] [place] [address]  [id number]   Location Phone: [phone]          Chief Complaint  · Left shoulder pain      History Of Present Illness  Gerald Escalera is a 55 year old /White male who presents today to [facility].      The patient presents here today for follow up evaluation of left shoulder.  The patient is here requesting another injection. He claims the injection helps but the last one recently wore off at the end of November. He does claim he has numbness to his thumb. He locates pain to his bicep and his elbow as well. He takes ibuprofen for the pain. He also uses voltaren gel.          Past Medical History  AC joint arthropathy; Cervical radiculopathy; Cervicalgia; Complete tear of right rotator cuff; Herniated disc, C5-6; Incomplete tear of right rotator cuff; Right shoulder pain; Rotator cuff injury; Subacromial impingement of right shoulder; Subacromial impingement, right; Superior glenoid labrum lesion of right shoulder, initial encounter         Past Surgical History  Cholecystecomy         Medication List  Zithromax Z-Donnie 250 mg oral tablet         Allergy List  NO KNOWN DRUG ALLERGIES       Allergies Reconciled  Family Medical History  Family history of stroke         Social History  Alcohol (Never); ; lives alone; ; No known infection risk; Tobacco (Never); Working         Review of Systems  · Constitutional  o Denies  o : fever, chills, weight loss  · Cardiovascular  o Denies  o : chest pain, shortness of breath  · Gastrointestinal  o Denies  o : liver disease, heartburn, nausea, blood in stools  · Genitourinary  o Denies  o :  "painful urination, blood in urine  · Integument  o Denies  o : rash, itching  · Neurologic  o Denies  o : headache, weakness, loss of consciousness  · Musculoskeletal  o Denies  o : painful, swollen joints  · Psychiatric  o Denies  o : drug/alcohol addiction, anxiety, depression      Vitals  Date Time BP Position Site L\R Cuff Size HR RR TEMP (F) WT  HT  BMI kg/m2 BSA m2 O2 Sat FR L/min FiO2 HC       01/08/2021 09:11 AM         285lbs 0oz 5'  11\" 39.75 2.54             Physical Examination  · Constitutional  o Appearance  o : well developed, well-nourished, no obvious deformities present  · Head and Face  o Head  o :   § Inspection  § : normocephalic  o Face  o :   § Inspection  § : no facial lesions  · Eyes  o Conjunctivae  o : conjunctivae normal  o Sclerae  o : sclerae white  · Ears, Nose, Mouth and Throat  o Ears  o :   § External Ears  § : appearance within normal limits  § Hearing  § : intact  o Nose  o :   § External Nose  § : appearance normal  · Neck  o Inspection/Palpation  o : normal appearance  o Range of Motion  o : full range of motion  · Respiratory  o Respiratory Effort  o : breathing unlabored  o Inspection of Chest  o : normal appearance  o Auscultation of Lungs  o : no audible wheezing or rales  · Cardiovascular  o Heart  o : regular rate  · Gastrointestinal  o Abdominal Examination  o : soft and non-tender  · Skin and Subcutaneous Tissue  o General Inspection  o : intact, no rashes  · Psychiatric  o General  o : Alert and oriented x3  o Judgement and Insight  o : judgment and insight intact  o Mood and Affect  o : mood normal, affect appropriate  · Left Shoulder  o Inspection  o : Elevation 180. Abduction 160. ER 90. IR 70. 4+/5 Supraspinatus strength. infraspinatus 4+/5. infrared subscap 5/5. IR T12.   · Injection Note/Aspiration Note  o Site  o : left shoulder  o Procedure  o : Procedure: After educating the patient, patient gave consent for procedure. After using Chloraprep, the joint space " was injected. The patient tolerated the procedure well.  o Medication  o : 80 mg of DepoMedrol with 9cc of 1% Lidocaine          Assessment  · Left shoulder pain, unspecified chronicity     719.41/M25.512  · Rotator cuff tear     840.4/M75.100      Plan  · Orders  o Depo-Medrol injection 80mg () - - 01/08/2021   Lot [id number] Exp 11 2021 [employer] Administered by DANO ARSHAD MD   o Shoulder Intra-articular Injection without US Guidance [facility] ([id number]) - - 01/08/2021   Lot [id number] Exp 03 01 2022 [employer] Administered by DANO ARSHAD MD   · Medications  o Medications have been Reconciled  o Transition of Care or Provider Policy  · Instructions  o Reviewed the patient's Past Medical, Social, and Family history as well as the ROS at today's visit, no changes.  o Call or return if worsening symptoms.  o Discussed surgery.  o Risks/benefits discussed with patient including, but not limited to: infection, bleeding, neurovascular damage, malunion, nonunion, aesthetic deformity, need for further surgery, and death.  o Surgery pamphlet given.  o The above service was scribed by Petra Edmonds on my behalf and I attest to the accuracy of the note. jsb  o Discussed treatment options with conservative treatment vs operative treatment. Conservative treatment would be to continue steroid injections as long as they are helping. Operative treatment option is an Arthroscopic vs mini open rotator cuff repair, SAC/DCE, biceps tenodesis. Discussed the risks and benefits of the surgery with the patient. The patient expressed understanding but wishes to proceed with another left shoulder injection at this time. Plan for injection today. May call back if he decides to schedule surgery.            Electronically Signed by: Petra Edmonds MA -Author on January 8, 2021 01:51:42 PM  Electronically Co-signed by: Dano Arshad MD -Reviewer on January 10, 2021 08:19:53 PM

## 2021-05-14 NOTE — PROGRESS NOTES
Progress Note      Patient Name: Gerald Escalera   Patient ID: [id number]   Sex: Male   YOB: 1965    Primary Care Provider: Radames Price PA-C   Referring Provider: Jorgito Marquez MD    Visit Date: April 13, 2021    Provider: Jorgito Marquez MD   Location: [facility]   Location Address: [address] [place] [address]  [id number]   Location Phone: [phone]          Chief Complaint  · Left shoulder pain       History Of Present Illness  Gerald Escalera is a 55 year old /White male who presents today to [facility].      The patient presents here today for follow up evaluation of her left shoulder. He has been treating his rotator cuff injury conservatively with injections. The patient states his shoulder is still causing him some pain but not as much as previously. He still reports pain at night that does wake him up. He takes ibuprofen and Tylenol. The patient has been doing home exercises and going to the gym.       Past Medical History  AC joint arthropathy; Cervical radiculopathy; Cervicalgia; Complete tear of right rotator cuff; Herniated disc, C5-6; Incomplete tear of right rotator cuff; Right shoulder pain; Rotator cuff injury; Subacromial impingement of right shoulder; Subacromial impingement, right; Superior glenoid labrum lesion of right shoulder, initial encounter         Past Surgical History  Cholecystecomy         Medication List  Zithromax Z-Donnie 250 mg oral tablet         Allergy List  NO KNOWN DRUG ALLERGIES       Allergies Reconciled  Family Medical History  Family history of stroke         Social History  Alcohol (Never); ; lives alone; ; No known infection risk; Tobacco (Never); Working         Review of Systems  · Constitutional  o Denies  o : fever, chills, weight loss  · Cardiovascular  o Denies  o : chest pain, shortness of breath  · Gastrointestinal  o Denies  o : liver disease, heartburn, nausea, blood in  "stools  · Genitourinary  o Denies  o : painful urination, blood in urine  · Integument  o Denies  o : rash, itching  · Neurologic  o Denies  o : headache, weakness, loss of consciousness  · Musculoskeletal  o Denies  o : painful, swollen joints  · Psychiatric  o Denies  o : drug/alcohol addiction, anxiety, depression      Vitals  Date Time BP Position Site L\R Cuff Size HR RR TEMP (F) WT  HT  BMI kg/m2 BSA m2 O2 Sat FR L/min FiO2        04/13/2021 01:39 PM      90 - R   303lbs 8oz 5'  11\" 42.33 2.63 96 %            Physical Examination  · Constitutional  o Appearance  o : well developed, well-nourished, no obvious deformities present  · Head and Face  o Head  o :   § Inspection  § : normocephalic  o Face  o :   § Inspection  § : no facial lesions  · Eyes  o Conjunctivae  o : conjunctivae normal  o Sclerae  o : sclerae white  · Ears, Nose, Mouth and Throat  o Ears  o :   § External Ears  § : appearance within normal limits  § Hearing  § : intact  o Nose  o :   § External Nose  § : appearance normal  · Neck  o Inspection/Palpation  o : normal appearance  o Range of Motion  o : full range of motion  · Respiratory  o Respiratory Effort  o : breathing unlabored  o Inspection of Chest  o : normal appearance  o Auscultation of Lungs  o : no audible wheezing or rales  · Cardiovascular  o Heart  o : regular rate  · Gastrointestinal  o Abdominal Examination  o : soft and non-tender  · Skin and Subcutaneous Tissue  o General Inspection  o : intact, no rashes  · Psychiatric  o General  o : Alert and oriented x3  o Judgement and Insight  o : judgment and insight intact  o Mood and Affect  o : mood normal, affect appropriate  · Left Shoulder  o Inspection  o : . IR to T12. ER 90. Abduction 155. 4+ Supraspinatus strength. 4+ infraspinatus. 5/5 infrared subscap. Positive impingement signs. Tender over the acromioclavicular joint and anterior shoulder.   · Injection Note/Aspiration Note  o Site  o : Left shoulder "   o Procedure  o : Procedure: After educating the patient, patient gave consent for procedure. After using Chloraprep, the joint space was injected. The patient tolerated the procedure well.  o Medication  o : 80 mg of DepoMedrol with 9cc of 1% Lidocaine          Assessment  · Left shoulder pain, unspecified chronicity     719.41/M25.512  · Rotator cuff tear     840.4/M75.100      Plan  · Orders  o Depo-Medrol injection 80mg () - - 04/13/2021   Lot [id number] Exp 02 2022 [employer] Administered by DANO ARSHAD MD  o Shoulder Intra-articular Injection without US Guidance [facility] ([id number]) - - 04/13/2021   Lot [id number] Exp 02 01 2022 [employer] Administered by DANO ARSHAD MD  · Medications  o Medications have been Reconciled  o Transition of Care or Provider Policy  · Instructions  o Reviewed the patient's Past Medical, Social, and Family history as well as the ROS at today's visit, no changes.  o Call or return if worsening symptoms.  o Follow up in 3 months.  o The above service was scribed by Petra Edmonds on my behalf and I attest to the accuracy of the note. jsb  o Discussed the risks and benefits of a steroid injection. The patient expressed understanding and wished to proceed. He tolerated the injection well. Prescription for Meloxicam given today.   o Electronically Identified Patient Education Materials Provided Electronically            Electronically Signed by: Petra Edmonds MA -Author on April 13, 2021 03:42:49 PM  Electronically Co-signed by: Dano Arshad MD -Reviewer on April 13, 2021 10:14:36 PM

## 2021-05-15 VITALS — BODY MASS INDEX: 38.22 KG/M2 | WEIGHT: 273 LBS | HEIGHT: 71 IN | RESPIRATION RATE: 16 BRPM

## 2021-05-15 VITALS
BODY MASS INDEX: 40.77 KG/M2 | WEIGHT: 291.25 LBS | SYSTOLIC BLOOD PRESSURE: 135 MMHG | HEIGHT: 71 IN | DIASTOLIC BLOOD PRESSURE: 80 MMHG | OXYGEN SATURATION: 96 % | HEART RATE: 70 BPM

## 2021-05-15 VITALS — BODY MASS INDEX: 38.22 KG/M2 | WEIGHT: 273 LBS | RESPIRATION RATE: 12 BRPM | HEIGHT: 71 IN

## 2021-05-15 VITALS — WEIGHT: 285 LBS | BODY MASS INDEX: 39.9 KG/M2 | HEART RATE: 74 BPM | OXYGEN SATURATION: 96 % | HEIGHT: 71 IN

## 2021-05-15 VITALS — WEIGHT: 237 LBS | RESPIRATION RATE: 16 BRPM | BODY MASS INDEX: 33.18 KG/M2 | HEIGHT: 71 IN

## 2021-05-15 VITALS — HEIGHT: 71 IN | OXYGEN SATURATION: 98 % | BODY MASS INDEX: 39.9 KG/M2 | HEART RATE: 74 BPM | WEIGHT: 285 LBS

## 2021-05-15 VITALS — WEIGHT: 285 LBS | BODY MASS INDEX: 39.9 KG/M2 | HEART RATE: 74 BPM | HEIGHT: 71 IN | OXYGEN SATURATION: 95 %

## 2021-05-15 VITALS — TEMPERATURE: 98.1 F | HEIGHT: 71 IN | BODY MASS INDEX: 41.19 KG/M2 | WEIGHT: 294.18 LBS

## 2021-05-15 VITALS — HEART RATE: 66 BPM | HEIGHT: 71 IN | BODY MASS INDEX: 39.9 KG/M2 | OXYGEN SATURATION: 96 % | WEIGHT: 285 LBS

## 2021-05-15 VITALS — BODY MASS INDEX: 37.1 KG/M2 | HEIGHT: 71 IN | RESPIRATION RATE: 12 BRPM | WEIGHT: 265 LBS

## 2021-05-15 VITALS — WEIGHT: 273 LBS | BODY MASS INDEX: 38.22 KG/M2 | HEIGHT: 71 IN | RESPIRATION RATE: 16 BRPM

## 2021-05-16 VITALS — HEIGHT: 71 IN | BODY MASS INDEX: 37.1 KG/M2 | WEIGHT: 265 LBS | RESPIRATION RATE: 18 BRPM

## 2021-05-16 VITALS — WEIGHT: 265 LBS | HEIGHT: 71 IN | BODY MASS INDEX: 37.1 KG/M2 | RESPIRATION RATE: 16 BRPM

## 2021-05-16 VITALS — HEIGHT: 71 IN | RESPIRATION RATE: 16 BRPM | BODY MASS INDEX: 37.1 KG/M2 | WEIGHT: 265 LBS

## 2021-07-27 ENCOUNTER — OFFICE VISIT (OUTPATIENT)
Dept: ORTHOPEDIC SURGERY | Facility: CLINIC | Age: 56
End: 2021-07-27

## 2021-07-27 VITALS — BODY MASS INDEX: 42 KG/M2 | HEIGHT: 71 IN | WEIGHT: 300 LBS

## 2021-07-27 DIAGNOSIS — M25.512 LEFT SHOULDER PAIN, UNSPECIFIED CHRONICITY: Primary | ICD-10-CM

## 2021-07-27 DIAGNOSIS — M75.102 TEAR OF LEFT ROTATOR CUFF, UNSPECIFIED TEAR EXTENT, UNSPECIFIED WHETHER TRAUMATIC: Primary | ICD-10-CM

## 2021-07-27 PROCEDURE — 99213 OFFICE O/P EST LOW 20 MIN: CPT | Performed by: ORTHOPAEDIC SURGERY

## 2021-07-27 PROCEDURE — 20610 DRAIN/INJ JOINT/BURSA W/O US: CPT | Performed by: ORTHOPAEDIC SURGERY

## 2021-07-27 RX ORDER — MELOXICAM 15 MG/1
15 TABLET ORAL DAILY
Qty: 30 TABLET | Refills: 2 | Status: SHIPPED | OUTPATIENT
Start: 2021-07-27 | End: 2022-03-01

## 2021-07-27 RX ORDER — MELOXICAM 15 MG/1
TABLET ORAL
COMMUNITY
Start: 2021-06-20 | End: 2021-07-27 | Stop reason: SDUPTHER

## 2021-07-27 RX ADMIN — METHYLPREDNISOLONE ACETATE 80 MG: 80 INJECTION, SUSPENSION INTRA-ARTICULAR; INTRALESIONAL; INTRAMUSCULAR; SOFT TISSUE at 15:20

## 2021-07-27 RX ADMIN — LIDOCAINE HYDROCHLORIDE 9 ML: 10 INJECTION, SOLUTION INFILTRATION; PERINEURAL at 15:20

## 2021-08-01 RX ORDER — LIDOCAINE HYDROCHLORIDE 10 MG/ML
9 INJECTION, SOLUTION INFILTRATION; PERINEURAL
Status: COMPLETED | OUTPATIENT
Start: 2021-07-27 | End: 2021-07-27

## 2021-08-01 RX ORDER — METHYLPREDNISOLONE ACETATE 80 MG/ML
80 INJECTION, SUSPENSION INTRA-ARTICULAR; INTRALESIONAL; INTRAMUSCULAR; SOFT TISSUE
Status: COMPLETED | OUTPATIENT
Start: 2021-07-27 | End: 2021-07-27

## 2021-10-12 ENCOUNTER — OFFICE VISIT (OUTPATIENT)
Dept: ORTHOPEDIC SURGERY | Facility: CLINIC | Age: 56
End: 2021-10-12

## 2021-10-12 ENCOUNTER — PREP FOR SURGERY (OUTPATIENT)
Dept: OTHER | Facility: HOSPITAL | Age: 56
End: 2021-10-12

## 2021-10-12 VITALS — BODY MASS INDEX: 43.37 KG/M2 | HEIGHT: 71 IN | OXYGEN SATURATION: 97 % | WEIGHT: 309.8 LBS | HEART RATE: 77 BPM

## 2021-10-12 DIAGNOSIS — M75.122 COMPLETE TEAR OF LEFT ROTATOR CUFF, UNSPECIFIED WHETHER TRAUMATIC: Primary | ICD-10-CM

## 2021-10-12 PROCEDURE — 99214 OFFICE O/P EST MOD 30 MIN: CPT | Performed by: ORTHOPAEDIC SURGERY

## 2021-10-12 RX ORDER — CEFAZOLIN SODIUM IN 0.9 % NACL 3 G/100 ML
3 INTRAVENOUS SOLUTION, PIGGYBACK (ML) INTRAVENOUS ONCE
Status: CANCELLED | OUTPATIENT
Start: 2021-10-12 | End: 2021-10-12

## 2021-10-12 NOTE — PROGRESS NOTES
"Chief Complaint  Pain of the Left Shoulder     Subjective      Gerald Escalera presents to [facility] for follow up evaluation of the left shoulder. He has been treating his rotator cuff injury conservatively with injections. The patient states his shoulder is still causing him some pain but not as much as previously. He still reports pain at night that does wake him up. He takes Mobic daily. He locates his pain to anterior lateral shoulder and radiates down. The patient is ready to discuss operative treatment for his left shoulder.     No Known Allergies     Social History     Socioeconomic History   • Marital status:    Tobacco Use   • Smoking status: Never Smoker   • Smokeless tobacco: Never Used        Review of Systems     Objective   Vital Signs:   Pulse 77   Ht 180.3 cm (71\")   Wt (!) 141 kg (309 lb 12.8 oz)   SpO2 97%   BMI 43.21 kg/m²       Physical Exam  Constitutional:       Appearance: Normal appearance. The patient is well-developed and normal weight.   HENT:      Head: Normocephalic.      Right Ear: Hearing and external ear normal.      Left Ear: Hearing and external ear normal.      Nose: Nose normal.   Eyes:      Conjunctiva/sclera: Conjunctivae normal.   Cardiovascular:      Rate and Rhythm: Normal rate.   Pulmonary:      Effort: Pulmonary effort is normal.      Breath sounds: No wheezing or rales.   Abdominal:      Palpations: Abdomen is soft.      Tenderness: There is no abdominal tenderness.   Musculoskeletal:      Cervical back: Normal range of motion.   Skin:     Findings: No rash.   Neurological:      Mental Status: The patient is alert and oriented to person, place, and time.   Psychiatric:         Mood and Affect: Mood and affect normal.         Judgment: Judgment normal.       Ortho Exam        Left shoulder- . IR to T12. ER 90. Abduction 165. 4+ Supraspinatus strength. 5/5 infraspinatus. 5/5 infrared subscap. Positive impingement signs. Tender " over the acromioclavicular joint and anterior shoulder.     Procedures    Imaging Results (Most Recent)     None           Result Review :       No results found.           Assessment and Plan     DX:  Left shoulder rotator cuff tear    Discussed the treatment options with the patient, operative vs non-operative. Discussed the risks and benefits of a left shoulder arthroscopic vs mini open rotator cuff repair, SAD with possible biceps tenodesis. The patient expressed understanding and wished to proceed.     Discussed surgery., Risks/benefits discussed with patient including, but not limited to: infection, bleeding, neurovascular damage, malunion, nonunion, aesthetic deformity, need for further surgery, and death., Discussed with patient the implant type being used during surgery and patient understands and desires to proceed., Surgery pamphlet given. and Call or return if worsening symptoms.    Follow Up     2 weeks postoperatively.       Patient was given instructions and counseling regarding his condition or for health maintenance advice. Please see specific information pulled into the AVS if appropriate.     Scribed for Jorgito Marquez MD by Petra Edmonds.  10/12/21   15:15 EDT    I have personally performed the services described in this document as scribed by the above individual and it is both accurate and complete. Jorgito Marquez MD 10/13/21

## 2021-11-05 RX ORDER — MELOXICAM 15 MG/1
15 TABLET ORAL DAILY
COMMUNITY
Start: 2021-05-21 | End: 2021-11-05

## 2021-11-15 ENCOUNTER — ANESTHESIA (OUTPATIENT)
Dept: PERIOP | Facility: HOSPITAL | Age: 56
End: 2021-11-15

## 2021-11-15 ENCOUNTER — ANESTHESIA EVENT (OUTPATIENT)
Dept: PERIOP | Facility: HOSPITAL | Age: 56
End: 2021-11-15

## 2021-11-15 ENCOUNTER — HOSPITAL ENCOUNTER (OUTPATIENT)
Facility: HOSPITAL | Age: 56
Setting detail: HOSPITAL OUTPATIENT SURGERY
Discharge: HOME | End: 2021-11-15
Attending: ORTHOPAEDIC SURGERY | Admitting: ORTHOPAEDIC SURGERY

## 2021-11-15 VITALS
SYSTOLIC BLOOD PRESSURE: 125 MMHG | DIASTOLIC BLOOD PRESSURE: 81 MMHG | BODY MASS INDEX: 42.28 KG/M2 | HEIGHT: 71 IN | OXYGEN SATURATION: 94 % | TEMPERATURE: 97.5 F | RESPIRATION RATE: 20 BRPM | HEART RATE: 88 BPM | WEIGHT: 302.03 LBS

## 2021-11-15 DIAGNOSIS — M75.122 COMPLETE TEAR OF LEFT ROTATOR CUFF, UNSPECIFIED WHETHER TRAUMATIC: ICD-10-CM

## 2021-11-15 LAB — QT INTERVAL: 381 MS

## 2021-11-15 PROCEDURE — 25010000002 NEOSTIGMINE 10 MG/10ML SOLUTION: Performed by: NURSE ANESTHETIST, CERTIFIED REGISTERED

## 2021-11-15 PROCEDURE — 25010000002 HYDROMORPHONE 1 MG/ML SOLUTION: Performed by: NURSE ANESTHETIST, CERTIFIED REGISTERED

## 2021-11-15 PROCEDURE — 25010000002 CEFAZOLIN PER 500 MG: Performed by: ORTHOPAEDIC SURGERY

## 2021-11-15 PROCEDURE — 76942 ECHO GUIDE FOR BIOPSY: CPT | Performed by: ORTHOPAEDIC SURGERY

## 2021-11-15 PROCEDURE — 93010 ELECTROCARDIOGRAM REPORT: CPT | Performed by: INTERNAL MEDICINE

## 2021-11-15 PROCEDURE — 23412 REPAIR ROTATOR CUFF CHRONIC: CPT | Performed by: ORTHOPAEDIC SURGERY

## 2021-11-15 PROCEDURE — C1713 ANCHOR/SCREW BN/BN,TIS/BN: HCPCS | Performed by: ORTHOPAEDIC SURGERY

## 2021-11-15 PROCEDURE — 25010000002 HYDRALAZINE PER 20 MG: Performed by: NURSE ANESTHETIST, CERTIFIED REGISTERED

## 2021-11-15 PROCEDURE — 25010000002 MIDAZOLAM PER 1MG: Performed by: ANESTHESIOLOGY

## 2021-11-15 PROCEDURE — 29822 SHO ARTHRS SRG LMTD DBRDMT: CPT | Performed by: ORTHOPAEDIC SURGERY

## 2021-11-15 PROCEDURE — 25010000002 PROPOFOL 10 MG/ML EMULSION: Performed by: NURSE ANESTHETIST, CERTIFIED REGISTERED

## 2021-11-15 PROCEDURE — 25010000002 DEXAMETHASONE PER 1 MG: Performed by: NURSE ANESTHETIST, CERTIFIED REGISTERED

## 2021-11-15 PROCEDURE — 93005 ELECTROCARDIOGRAM TRACING: CPT | Performed by: ORTHOPAEDIC SURGERY

## 2021-11-15 PROCEDURE — 25010000002 EPINEPHRINE PER 0.1 MG: Performed by: ORTHOPAEDIC SURGERY

## 2021-11-15 PROCEDURE — L3670 SO ACRO/CLAV CAN WEB PRE OTS: HCPCS | Performed by: ORTHOPAEDIC SURGERY

## 2021-11-15 PROCEDURE — 25010000002 ONDANSETRON PER 1 MG: Performed by: NURSE ANESTHETIST, CERTIFIED REGISTERED

## 2021-11-15 PROCEDURE — 23430 REPAIR BICEPS TENDON: CPT | Performed by: ORTHOPAEDIC SURGERY

## 2021-11-15 PROCEDURE — 63710000001 PROMETHAZINE PER 12.5 MG: Performed by: NURSE ANESTHETIST, CERTIFIED REGISTERED

## 2021-11-15 PROCEDURE — 25010000002 ROPIVACAINE PER 1 MG: Performed by: NURSE ANESTHETIST, CERTIFIED REGISTERED

## 2021-11-15 DEVICE — SYS IMP TENODESIS PROX: Type: IMPLANTABLE DEVICE | Site: HUMERUS | Status: FUNCTIONAL

## 2021-11-15 DEVICE — SUT/ANCH BIOCOMP SWIVELOCK/C 4.75X19.1MM: Type: IMPLANTABLE DEVICE | Site: SHOULDER | Status: FUNCTIONAL

## 2021-11-15 DEVICE — SUT/ANCH BIOCOMP CORKSCREW FUL/THRD TRIPLEPLAY 5.5MM: Type: IMPLANTABLE DEVICE | Site: SHOULDER | Status: FUNCTIONAL

## 2021-11-15 RX ORDER — ROPIVACAINE HYDROCHLORIDE 5 MG/ML
INJECTION, SOLUTION EPIDURAL; INFILTRATION; PERINEURAL
Status: COMPLETED | OUTPATIENT
Start: 2021-11-15 | End: 2021-11-15

## 2021-11-15 RX ORDER — BUPIVACAINE HYDROCHLORIDE 5 MG/ML
INJECTION, SOLUTION EPIDURAL; INTRACAUDAL AS NEEDED
Status: DISCONTINUED | OUTPATIENT
Start: 2021-11-15 | End: 2021-11-15 | Stop reason: HOSPADM

## 2021-11-15 RX ORDER — SODIUM CHLORIDE, SODIUM LACTATE, POTASSIUM CHLORIDE, CALCIUM CHLORIDE 600; 310; 30; 20 MG/100ML; MG/100ML; MG/100ML; MG/100ML
9 INJECTION, SOLUTION INTRAVENOUS CONTINUOUS PRN
Status: DISCONTINUED | OUTPATIENT
Start: 2021-11-15 | End: 2021-11-15 | Stop reason: HOSPADM

## 2021-11-15 RX ORDER — PROMETHAZINE HYDROCHLORIDE 12.5 MG/1
25 TABLET ORAL ONCE AS NEEDED
Status: COMPLETED | OUTPATIENT
Start: 2021-11-15 | End: 2021-11-15

## 2021-11-15 RX ORDER — LIDOCAINE HYDROCHLORIDE 20 MG/ML
INJECTION, SOLUTION INFILTRATION; PERINEURAL AS NEEDED
Status: DISCONTINUED | OUTPATIENT
Start: 2021-11-15 | End: 2021-11-15 | Stop reason: SURG

## 2021-11-15 RX ORDER — ONDANSETRON 2 MG/ML
4 INJECTION INTRAMUSCULAR; INTRAVENOUS ONCE AS NEEDED
Status: DISCONTINUED | OUTPATIENT
Start: 2021-11-15 | End: 2021-11-15 | Stop reason: HOSPADM

## 2021-11-15 RX ORDER — GLYCOPYRROLATE 0.2 MG/ML
INJECTION INTRAMUSCULAR; INTRAVENOUS AS NEEDED
Status: DISCONTINUED | OUTPATIENT
Start: 2021-11-15 | End: 2021-11-15 | Stop reason: SURG

## 2021-11-15 RX ORDER — MIDAZOLAM HYDROCHLORIDE 2 MG/2ML
4 INJECTION, SOLUTION INTRAMUSCULAR; INTRAVENOUS ONCE
Status: COMPLETED | OUTPATIENT
Start: 2021-11-15 | End: 2021-11-15

## 2021-11-15 RX ORDER — ONDANSETRON 2 MG/ML
INJECTION INTRAMUSCULAR; INTRAVENOUS AS NEEDED
Status: DISCONTINUED | OUTPATIENT
Start: 2021-11-15 | End: 2021-11-15 | Stop reason: SURG

## 2021-11-15 RX ORDER — OXYCODONE AND ACETAMINOPHEN 7.5; 325 MG/1; MG/1
1-2 TABLET ORAL EVERY 4 HOURS PRN
Qty: 40 TABLET | Refills: 0 | Status: SHIPPED | OUTPATIENT
Start: 2021-11-15 | End: 2023-02-16

## 2021-11-15 RX ORDER — ACETAMINOPHEN 500 MG
1000 TABLET ORAL ONCE
Status: COMPLETED | OUTPATIENT
Start: 2021-11-15 | End: 2021-11-15

## 2021-11-15 RX ORDER — DEXMEDETOMIDINE HYDROCHLORIDE 100 UG/ML
INJECTION, SOLUTION INTRAVENOUS AS NEEDED
Status: DISCONTINUED | OUTPATIENT
Start: 2021-11-15 | End: 2021-11-15 | Stop reason: SURG

## 2021-11-15 RX ORDER — GLYCOPYRROLATE 0.2 MG/ML
0.2 INJECTION INTRAMUSCULAR; INTRAVENOUS
Status: COMPLETED | OUTPATIENT
Start: 2021-11-15 | End: 2021-11-15

## 2021-11-15 RX ORDER — KETAMINE HYDROCHLORIDE 50 MG/ML
INJECTION, SOLUTION, CONCENTRATE INTRAMUSCULAR; INTRAVENOUS AS NEEDED
Status: DISCONTINUED | OUTPATIENT
Start: 2021-11-15 | End: 2021-11-15 | Stop reason: SURG

## 2021-11-15 RX ORDER — PROMETHAZINE HYDROCHLORIDE 25 MG/1
25 SUPPOSITORY RECTAL ONCE AS NEEDED
Status: COMPLETED | OUTPATIENT
Start: 2021-11-15 | End: 2021-11-15

## 2021-11-15 RX ORDER — METOPROLOL TARTRATE 5 MG/5ML
INJECTION INTRAVENOUS AS NEEDED
Status: DISCONTINUED | OUTPATIENT
Start: 2021-11-15 | End: 2021-11-15 | Stop reason: SURG

## 2021-11-15 RX ORDER — OXYCODONE HYDROCHLORIDE 5 MG/1
5 TABLET ORAL
Status: DISCONTINUED | OUTPATIENT
Start: 2021-11-15 | End: 2021-11-15 | Stop reason: HOSPADM

## 2021-11-15 RX ORDER — DEXAMETHASONE SODIUM PHOSPHATE 4 MG/ML
INJECTION, SOLUTION INTRA-ARTICULAR; INTRALESIONAL; INTRAMUSCULAR; INTRAVENOUS; SOFT TISSUE AS NEEDED
Status: DISCONTINUED | OUTPATIENT
Start: 2021-11-15 | End: 2021-11-15 | Stop reason: SURG

## 2021-11-15 RX ORDER — PROPOFOL 10 MG/ML
VIAL (ML) INTRAVENOUS AS NEEDED
Status: DISCONTINUED | OUTPATIENT
Start: 2021-11-15 | End: 2021-11-15 | Stop reason: SURG

## 2021-11-15 RX ORDER — CEFAZOLIN SODIUM IN 0.9 % NACL 3 G/100 ML
3 INTRAVENOUS SOLUTION, PIGGYBACK (ML) INTRAVENOUS ONCE
Status: COMPLETED | OUTPATIENT
Start: 2021-11-15 | End: 2021-11-15

## 2021-11-15 RX ORDER — NEOSTIGMINE METHYLSULFATE 1 MG/ML
INJECTION, SOLUTION INTRAVENOUS AS NEEDED
Status: DISCONTINUED | OUTPATIENT
Start: 2021-11-15 | End: 2021-11-15 | Stop reason: SURG

## 2021-11-15 RX ORDER — MEPERIDINE HYDROCHLORIDE 25 MG/ML
12.5 INJECTION INTRAMUSCULAR; INTRAVENOUS; SUBCUTANEOUS
Status: DISCONTINUED | OUTPATIENT
Start: 2021-11-15 | End: 2021-11-15 | Stop reason: HOSPADM

## 2021-11-15 RX ORDER — HYDRALAZINE HYDROCHLORIDE 20 MG/ML
INJECTION INTRAMUSCULAR; INTRAVENOUS AS NEEDED
Status: DISCONTINUED | OUTPATIENT
Start: 2021-11-15 | End: 2021-11-15 | Stop reason: SURG

## 2021-11-15 RX ORDER — SUCCINYLCHOLINE/SOD CL,ISO/PF 100 MG/5ML
SYRINGE (ML) INTRAVENOUS AS NEEDED
Status: DISCONTINUED | OUTPATIENT
Start: 2021-11-15 | End: 2021-11-15 | Stop reason: SURG

## 2021-11-15 RX ORDER — ROCURONIUM BROMIDE 10 MG/ML
INJECTION, SOLUTION INTRAVENOUS AS NEEDED
Status: DISCONTINUED | OUTPATIENT
Start: 2021-11-15 | End: 2021-11-15 | Stop reason: SURG

## 2021-11-15 RX ADMIN — HYDRALAZINE HYDROCHLORIDE 10 MG: 20 INJECTION INTRAMUSCULAR; INTRAVENOUS at 14:05

## 2021-11-15 RX ADMIN — HYDRALAZINE HYDROCHLORIDE 10 MG: 20 INJECTION INTRAMUSCULAR; INTRAVENOUS at 14:17

## 2021-11-15 RX ADMIN — ROCURONIUM BROMIDE 40 MG: 10 INJECTION INTRAVENOUS at 13:39

## 2021-11-15 RX ADMIN — HYDROMORPHONE HYDROCHLORIDE 0.5 MG: 1 INJECTION, SOLUTION INTRAMUSCULAR; INTRAVENOUS; SUBCUTANEOUS at 15:45

## 2021-11-15 RX ADMIN — HYDROMORPHONE HYDROCHLORIDE 0.5 MG: 1 INJECTION, SOLUTION INTRAMUSCULAR; INTRAVENOUS; SUBCUTANEOUS at 15:32

## 2021-11-15 RX ADMIN — METOPROLOL TARTRATE 5 MG: 1 INJECTION, SOLUTION INTRAVENOUS at 14:08

## 2021-11-15 RX ADMIN — ONDANSETRON 4 MG: 2 INJECTION INTRAMUSCULAR; INTRAVENOUS at 16:43

## 2021-11-15 RX ADMIN — Medication 3 G: at 13:33

## 2021-11-15 RX ADMIN — GLYCOPYRROLATE 0.4 MCG: 0.2 INJECTION INTRAMUSCULAR; INTRAVENOUS at 15:09

## 2021-11-15 RX ADMIN — ROPIVACAINE HYDROCHLORIDE 30 ML: 5 INJECTION, SOLUTION EPIDURAL; INFILTRATION; PERINEURAL at 11:36

## 2021-11-15 RX ADMIN — DEXMEDETOMIDINE HYDROCHLORIDE 40 MCG: 100 INJECTION, SOLUTION, CONCENTRATE INTRAVENOUS at 13:33

## 2021-11-15 RX ADMIN — KETAMINE HYDROCHLORIDE 30 MG: 50 INJECTION, SOLUTION INTRAMUSCULAR; INTRAVENOUS at 14:05

## 2021-11-15 RX ADMIN — PROPOFOL 200 MG: 10 INJECTION, EMULSION INTRAVENOUS at 13:33

## 2021-11-15 RX ADMIN — ONDANSETRON 4 MG: 2 INJECTION INTRAMUSCULAR; INTRAVENOUS at 13:33

## 2021-11-15 RX ADMIN — DEXAMETHASONE SODIUM PHOSPHATE 8 MG: 4 INJECTION INTRA-ARTICULAR; INTRALESIONAL; INTRAMUSCULAR; INTRAVENOUS; SOFT TISSUE at 13:33

## 2021-11-15 RX ADMIN — LIDOCAINE HYDROCHLORIDE 40 MG: 20 INJECTION, SOLUTION INFILTRATION; PERINEURAL at 13:33

## 2021-11-15 RX ADMIN — ROCURONIUM BROMIDE 10 MG: 10 INJECTION INTRAVENOUS at 13:33

## 2021-11-15 RX ADMIN — MIDAZOLAM HYDROCHLORIDE 4 MG: 1 INJECTION, SOLUTION INTRAMUSCULAR; INTRAVENOUS at 11:26

## 2021-11-15 RX ADMIN — HYDROMORPHONE HYDROCHLORIDE 0.5 MG: 1 INJECTION, SOLUTION INTRAMUSCULAR; INTRAVENOUS; SUBCUTANEOUS at 16:14

## 2021-11-15 RX ADMIN — PROMETHAZINE HYDROCHLORIDE 25 MG: 12.5 TABLET ORAL at 17:16

## 2021-11-15 RX ADMIN — METOPROLOL TARTRATE 5 MG: 1 INJECTION, SOLUTION INTRAVENOUS at 14:17

## 2021-11-15 RX ADMIN — ACETAMINOPHEN 1000 MG: 500 TABLET ORAL at 11:13

## 2021-11-15 RX ADMIN — KETAMINE HYDROCHLORIDE 20 MG: 50 INJECTION, SOLUTION INTRAMUSCULAR; INTRAVENOUS at 13:33

## 2021-11-15 RX ADMIN — PROPOFOL 20 MG: 10 INJECTION, EMULSION INTRAVENOUS at 14:50

## 2021-11-15 RX ADMIN — OXYCODONE HYDROCHLORIDE 5 MG: 5 TABLET ORAL at 15:32

## 2021-11-15 RX ADMIN — Medication 200 MG: at 13:33

## 2021-11-15 RX ADMIN — SODIUM CHLORIDE, POTASSIUM CHLORIDE, SODIUM LACTATE AND CALCIUM CHLORIDE 9 ML/HR: 600; 310; 30; 20 INJECTION, SOLUTION INTRAVENOUS at 11:12

## 2021-11-15 RX ADMIN — ROCURONIUM BROMIDE 20 MG: 10 INJECTION INTRAVENOUS at 13:48

## 2021-11-15 RX ADMIN — NEOSTIGMINE METHYLSULFATE 3 MG: 1 INJECTION INTRAVENOUS at 15:08

## 2021-11-15 RX ADMIN — PROPOFOL 200 MCG/KG/MIN: 10 INJECTION, EMULSION INTRAVENOUS at 13:35

## 2021-11-15 RX ADMIN — METOPROLOL TARTRATE 5 MG: 1 INJECTION, SOLUTION INTRAVENOUS at 14:51

## 2021-11-15 RX ADMIN — HYDROMORPHONE HYDROCHLORIDE 0.5 MG: 1 INJECTION, SOLUTION INTRAMUSCULAR; INTRAVENOUS; SUBCUTANEOUS at 16:00

## 2021-11-15 RX ADMIN — GLYCOPYRROLATE 0.2 MG: 0.2 INJECTION INTRAMUSCULAR; INTRAVENOUS at 13:30

## 2021-11-15 RX ADMIN — PROPOFOL 60 MG: 10 INJECTION, EMULSION INTRAVENOUS at 15:00

## 2021-11-15 NOTE — OP NOTE
SHOULDER ARTHROSCOPY WITH ROTATOR CUFF REPAIR  Procedure Report    Patient Name:  Gerald Escalera  YOB: 1965    Date of Surgery:  11/15/2021     Indications:  Patient has rotator cuff tear and has failed conservative treatment. Risks and benefits of surgery were discussed, including bleeding, infection, damage to neurovascular structures, anesthesia complications including death, continued pain and disability, need for additional procedures, among others. Informed consent was obtained and they wished to proceed.    Pre-op Diagnosis:   Complete tear of left rotator cuff, unspecified whether traumatic [M75.122]   Left shoulder SLAP tear       Post-Op Diagnosis Codes:     * Complete tear of left rotator cuff, unspecified whether traumatic [M75.122]   Left shoulder SLAP tear    Procedure/CPT® Codes:      Procedure(s):  LEFT SHOULDER ARTHROSCOPyY , MINI ROTATOR CUFF REPAIR,SUBACROMINAL DECOMPRESSION,  BICEPS TENDODESIS    Staff:  Surgeon(s):  Jorgito Marquez MD    Assistant: Ok Brown RN    Anesthesia: General    Estimated Blood Loss: 20cc    Implants:    Implant Name Type Inv. Item Serial No.  Lot No. LRB No. Used Action   SYS IMP TENODESIS PROX - [id number] Implant SYS IMP TENODESIS PROX  [employer] [id number] Left 1 Implanted   SUT/ANCH BIOCOMP CORKSCREW FUL/THRD TRIPLEPLAY 5.5MM - [id number] Implant SUT/ANCH BIOCOMP CORKSCREW FUL/THRD TRIPLEPLAY 5.5MM  [employer]X [id number] Left 2 Implanted   SUT/ANCH BIOCOMP SWIVELOCK/C 4.75X19.1MM - [id number] Implant SUT/ANCH BIOCOMP SWIVELOCK/C 4.75X19.1MM  [employer]REX [id number] Left 2 Implanted       Specimen:          None        Findings: rotator cuff tear, type II SLAP tear    Complications: none    Description of Procedure: The operative site was marked in preoperative holding area.  Interscalene nerve block was performed by the anesthesia provider.  The patient was brought the operating room and general anesthesia was applied.  There were placed  in the lateral decubitus position.  An axillary roll was placed and the patient was secured with a deflated beanbag.  The legs were padded and SCD boots were placed.  The contralateral limb was placed on an arm board and the affected limb was prepped and draped in usual sterile fashion and placed into a sterile traction arm pedraza.  Preoperative antibiotic was given.  Formal timeout was held.    Standard posterior portal was established and the arthroscope was inserted into the glenohumeral joint.  An anterior portal was established in the rotator cuff interval and a cannula was placed anteriorly.  The joint was inspected and the intra-articular and findings included a widely displaced type II SLAP tear involving the biceps tendon, labral anchor.  This extended anteriorly and posteriorly.  The ablation probe was used to release the biceps tendon for later tenodesis, and the motorized shaver was used to debride the labrum.  The labral cartilage is intact.  There appeared to be a anterior supraspinatus tear which appeared to be near full-thickness.      The arthroscope was removed and reinserted into the subacromial space posteriorly.  The lateral portal was established of the lateral acromion.  A motorized shaver and ablation probe were used to clear the subacromial space of adhesions and bursitis.  A motorized bur was used to resect approximately 1 cm from the anterior acromion and flatten the acromion from anterior to posterior and lateral to medial.    The subacromial findings include downsloping acromion.  There was a full-thickness anterior supraspinatus tear measuring around 1 to 1.5 cm anterior to posterior.    At this point the arthroscope was removed and the lateral portal was extended into a 3 cm incision.  The deltoid was split and the bursa was excised.  The rotator cuff tear was identified and mobilized.  A bleeding surface was treated at the greater tuberosity and to 5.5 Arthrex corkscrew anchors were  placed.  A scorpion suture passer was used to pass sutures through the rotator cuff and they were tied in alternating  knot fashion.  1 suture from each anchor was then passed laterally through a swivel lock anchor which was placed anteriorly and posteriorly.  The sutures were tensioned and the anchor was deployed.  The sutures were cut and the repair was secure.    The wound was irrigated and was closed with 0 Vicryl at the deltoid fascia level, 2-0 Vicryl at the subcutaneous level, and running Monocryl suture at the skin.  Mastisol Steri-Strips and sterile dressing was placed.  The portal incisions were closed with nylon suture.      At this point a 2-1/2 cm incision was made longitudinally in the axillary region just below the pectoralis tendon.  The subcutaneous tissues and fascia were divided.  The biceps tendon was retrieved into the wound and was sutured with a #2 fiber loop suture from the musculotendinous junction proximally.  The excess tendon was removed.  The sutures were placed through the biceps tendon button.  The proximal humerus was exposed and a bicortical drill pin was placed just below the pectoralis tendon.  The tendon was sized to 7 mm.  A 7 mm unicortical hole was made and the bony debris was irrigated.  The biceps button was placed bicortically and was flipped on the far cortex.  Sutures were pulled docking the tendon into the unicortical hole.  The suture was placed through the tendon and was tied securely.  The wound was irrigated and was closed with 3-0 undyed Vicryl in running Monocryl.  Local anesthetic was injected.  Mastisol Steri-Strips were placed.    Sterile dressing was placed.  The patient was placed into cold therapy and an immobilizer.  The patient awoke from anesthesia in stable condition.  There is no complications.  All counts were correct.    Assistant: Ok Brown RN  was responsible for performing the following activities: Retraction, Suction, Irrigation and  Placing Dressing and their skilled assistance was necessary for the success of this case.    Jorgito Marquez MD     Date: 11/15/2021  Time: 15:11 EST

## 2021-11-15 NOTE — ANESTHESIA PREPROCEDURE EVALUATION
Anesthesia Evaluation     Patient summary reviewed and Nursing notes reviewed                Airway   Mallampati: III  TM distance: >3 FB  Neck ROM: full  No difficulty expected  Dental          Pulmonary - negative pulmonary ROS and normal exam    breath sounds clear to auscultation  Cardiovascular - negative cardio ROS and normal exam    Rhythm: regular        Neuro/Psych- negative ROS  GI/Hepatic/Renal/Endo    (+) morbid obesity,      Musculoskeletal (-) negative ROS    Abdominal    Substance History - negative use     OB/GYN negative ob/gyn ROS         Other                        Anesthesia Plan    ASA 2     general with block     intravenous induction     Anesthetic plan, all risks, benefits, and alternatives have been provided, discussed and informed consent has been obtained with: patient.

## 2021-11-15 NOTE — ANESTHESIA PROCEDURE NOTES
Peripheral Block    Pre-sedation assessment completed: 11/15/2021 11:30 AM    Patient reassessed immediately prior to procedure    Patient location during procedure: pre-op  Start time: 11/15/2021 11:30 AM  Stop time: 11/15/2021 11:36 AM  Reason for block: at surgeon's request and post-op pain management  Performed by  Anesthesiologist: Beau Enriquez MD  Preanesthetic Checklist  Completed: patient identified, IV checked, site marked, risks and benefits discussed, surgical consent, monitors and equipment checked, pre-op evaluation and timeout performed  Prep:  Pt Position: supine (HOB elevated)  Sterile barriers:cap, washed/disinfected hands, sterile barriers, gloves, mask, partial drape and alcohol skin prep  Prep: ChloraPrep  Patient monitoring: blood pressure monitoring, continuous pulse oximetry and EKG  Procedure    Sedation: yes  Performed under: local infiltration  Guidance:ultrasound guided and nerve stimulator    ULTRASOUND INTERPRETATION.  Using ultrasound guidance a 22 G gauge needle was placed in close proximity to the brachial plexus nerve, at which point, under ultrasound guidance anesthetic was injected in the area of the nerve and spread of the anesthesia was seen on ultrasound in close proximity thereto.  There were no abnormalities seen on ultrasound; a digital image was taken; and the patient tolerated the procedure with no complications. Images:still images obtained, printed/placed on chart    Laterality:left  Block Type:interscalene  Injection Technique:single-shot  Needle Type:echogenic  Needle Gauge:22 G (2in)  Resistance on Injection: none    Medications Used: ropivacaine (NAROPIN) 0.5 % injection, 30 mL  Med administered at 11/15/2021 11:36 AM      Post Assessment  Injection Assessment: negative aspiration for heme, no paresthesia on injection and incremental injection  Patient Tolerance:comfortable throughout block  Complications:no  Additional Notes  The block or continuous infusion is  requested by the referring physician for management of postoperative pain, or pain related to a procedure. Ultrasound guidance (deemed medically necessary). Painless injection, pt was awake and conversant during the procedure without complications. Needle and surrounding structures visualized throughout procedure. No adverse reactions or complications seen during this period. Post-procedure image showed no signs of complication, and anatomy was consistent with an uncomplicated nerve blockade.

## 2021-11-15 NOTE — ANESTHESIA POSTPROCEDURE EVALUATION
Patient: Gerald Escalera    Procedure Summary     Date: 11/15/21 Room / Location: [facility]  / [facility]    Anesthesia Start: 1333 Anesthesia Stop: 1521    Procedure: LEFT SHOULDER ARTHROSCOPyY , MINI ROTATOR CUFF REPAIR,SUBACROMINAL DECOMPRESSION,  BICEPS TENDODESIS (Left Shoulder) Diagnosis:       Complete tear of left rotator cuff, unspecified whether traumatic      (Complete tear of left rotator cuff, unspecified whether traumatic [M75.122])    Surgeons: Jorgito Marquez MD Provider: Beau Enriquez MD    Anesthesia Type: general ASA Status: 2          Anesthesia Type: general    Vitals  Vitals Value Taken Time   /60 11/15/21 1531   Temp 36 °C (96.8 °F) 11/15/21 1519   Pulse 78 11/15/21 1532   Resp 20 11/15/21 1525   SpO2 94 % 11/15/21 1532   Vitals shown include unvalidated device data.        Post Anesthesia Care and Evaluation    Patient location during evaluation: bedside  Patient participation: complete - patient participated  Level of consciousness: awake  Pain management: adequate  Airway patency: patent  Anesthetic complications: No anesthetic complications  PONV Status: none  Cardiovascular status: acceptable  Respiratory status: acceptable  Hydration status: acceptable    Comments: An Anesthesiologist personally participated in the most demanding procedures (including induction and emergence if applicable) in the anesthesia plan, monitored the course of anesthesia administration at frequent intervals and remained physically present and available for immediate diagnosis and treatment of emergencies.

## 2021-11-15 NOTE — H&P
[facility]   HISTORY AND PHYSICAL    Patient Name: Gerald Escalera  : 1965  MRN: [id number]  Primary Care Physician:  Pravin Price PA  Date of admission: (Not on file)    Subjective   Subjective     Chief Complaint: Left shoulder pain    History of Present Illness: The patient is a 56-year-old male with left shoulder pain.  Previous MRI revealed a full-thickness rotator cuff tear.  The patient wishes to undergo rotator cuff repair.  He reports shoulder pain and difficulty with doing normal activities with the shoulder.  No new injury or trauma is reported.    Review of Systems : Negative except for those mentioned in the history of present illness    Personal History     Past Medical History:   Diagnosis Date   • Complete tear of left rotator cuff        Past Surgical History:   Procedure Laterality Date   • ROTATOR CUFF REPAIR Right        Family History: family history is not on file. Otherwise pertinent FHx was reviewed and not pertinent to current issue.    Social History:  reports that he has never smoked. He has never used smokeless tobacco. He reports previous alcohol use. He reports that he does not use drugs.    Home Medications:  meloxicam    Allergies:  No Known Allergies    Objective    Objective     Vitals:        Physical Exam  General: No apparent distress, alert and oriented x3  HEENT: Normocephalic/atraumatic  Neck: Supple  Cardiovascular: Regular heart rate  Chest: Unlabored breathing  Abdomen: Soft, nontender, nondistended  Musculoskeletal: Tender to palpation to the shoulder.  Positive pulses.  Neurovascular intact.  Positive impingement signs.  Decreased pacemaker strength.  Neurological: Grossly intact    Result Review    Result Review:  I have personally reviewed the results from the time of this admission to 2021 20:45 EST and agree with these findings:  []  Laboratory  []  Microbiology  [x]  Radiology  []  EKG/Telemetry   []  Cardiology/Vascular   []  Pathology  []  Old  records  []  Other:  Most notable findings include: Rotator cuff tear  Assessment/Plan   Assessment / Plan     Brief Patient Summary:  Gerald Escalera is a 56 y.o. male who with left shoulder rotator cuff tear    Active Hospital Problems:  Active Hospital Problems    Diagnosis    • **Complete tear of left rotator cuff      Plan: We discussed treatment options with patient.  We discussed operative versus nonoperative treatment.  He wishes to proceed with left shoulder arthroscopic versus mini open rotator cuff repair, SCD, possible biceps tenodesis.      DVT prophylaxis:  No DVT prophylaxis order currently exists.    CODE STATUS:       Admission Status:  I believe this patient meets outpatient status.    Electronically signed by Jorgito Marquez MD, 11/14/21, 8:45 PM EST.

## 2021-11-30 ENCOUNTER — OFFICE VISIT (OUTPATIENT)
Dept: ORTHOPEDIC SURGERY | Facility: CLINIC | Age: 56
End: 2021-11-30

## 2021-11-30 VITALS — HEIGHT: 71 IN | BODY MASS INDEX: 42 KG/M2 | HEART RATE: 82 BPM | WEIGHT: 300 LBS | OXYGEN SATURATION: 97 %

## 2021-11-30 DIAGNOSIS — Z98.890 S/P ARTHROSCOPY OF LEFT SHOULDER: Primary | ICD-10-CM

## 2021-11-30 PROCEDURE — 99024 POSTOP FOLLOW-UP VISIT: CPT | Performed by: ORTHOPAEDIC SURGERY

## 2021-11-30 NOTE — PROGRESS NOTES
"Chief Complaint  Pain of the Left Shoulder     Subjective      Gerald Escalera presents to [facility] for follow up evaluation of the left shoulder. The patient is S/P Left shoulder mini open rotator cuff repair SAD, biceps tendonesis 11/15/2021. His sutures were removed today. He is overall doing well. He has been wearing a sling. He has no new injury or complaints.     No Known Allergies     Social History     Socioeconomic History   • Marital status:    Tobacco Use   • Smoking status: Never Smoker   • Smokeless tobacco: Never Used   Vaping Use   • Vaping Use: Never used   Substance and Sexual Activity   • Alcohol use: Not Currently     Alcohol/week: 0.0 standard drinks   • Drug use: Never   • Sexual activity: Defer        Review of Systems     Objective   Vital Signs:   Pulse 82   Ht 180.3 cm (71\")   Wt 136 kg (300 lb)   SpO2 97%   BMI 41.84 kg/m²       Physical Exam  Constitutional:       Appearance: Normal appearance. The patient is well-developed and normal weight.   HENT:      Head: Normocephalic.      Right Ear: Hearing and external ear normal.      Left Ear: Hearing and external ear normal.      Nose: Nose normal.   Eyes:      Conjunctiva/sclera: Conjunctivae normal.   Cardiovascular:      Rate and Rhythm: Normal rate.   Pulmonary:      Effort: Pulmonary effort is normal.      Breath sounds: No wheezing or rales.   Abdominal:      Palpations: Abdomen is soft.      Tenderness: There is no abdominal tenderness.   Musculoskeletal:      Cervical back: Normal range of motion.   Skin:     Findings: No rash.   Neurological:      Mental Status: The patient is alert and oriented to person, place, and time.   Psychiatric:         Mood and Affect: Mood and affect normal.         Judgment: Judgment normal.       Ortho Exam      Left shoulder- incisions well healing. No signs of infection. Neurovascularly intact. Sensation to light touch median, radial, ulnar nerve. Positive AIN, " PIN, ulnar nerve. Positive pulses. Good strength in triceps, biceps, deltoid, wrist extensors and wrist flexors.     Procedures      Imaging Results (Most Recent)     None           Result Review :       Peripheral Block    Result Date: 11/15/2021  Narrative: Siria Card CRNA     11/15/2021 11:39 AM Peripheral Block Pre-sedation assessment completed: 11/15/2021 11:30 AM Patient reassessed immediately prior to procedure Patient location during procedure: pre-op Start time: 11/15/2021 11:30 AM Stop time: 11/15/2021 11:36 AM Reason for block: at surgeon's request and post-op pain management Performed by Anesthesiologist: Beau Enriquez MD Preanesthetic Checklist Completed: patient identified, IV checked, site marked, risks and benefits discussed, surgical consent, monitors and equipment checked, pre-op evaluation and timeout performed Prep: Pt Position: supine (HOB elevated) Sterile barriers:cap, washed/disinfected hands, sterile barriers, gloves, mask, partial drape and alcohol skin prep Prep: ChloraPrep Patient monitoring: blood pressure monitoring, continuous pulse oximetry and EKG Procedure Sedation: yes Performed under: local infiltration Guidance:ultrasound guided and nerve stimulator ULTRASOUND INTERPRETATION.  Using ultrasound guidance a 22 G gauge needle was placed in close proximity to the brachial plexus nerve, at which point, under ultrasound guidance anesthetic was injected in the area of the nerve and spread of the anesthesia was seen on ultrasound in close proximity thereto.  There were no abnormalities seen on ultrasound; a digital image was taken; and the patient tolerated the procedure with no complications. Images:still images obtained, printed/placed on chart Laterality:left Block Type:interscalene Injection Technique:single-shot Needle Type:echogenic Needle Gauge:22 G (2in) Resistance on Injection: none Medications Used: ropivacaine (NAROPIN) 0.5 % injection, 30 mL Med administered at  11/15/2021 11:36 AM Post Assessment Injection Assessment: negative aspiration for heme, no paresthesia on injection and incremental injection Patient Tolerance:comfortable throughout block Complications:no Additional Notes The block or continuous infusion is requested by the referring physician for management of postoperative pain, or pain related to a procedure. Ultrasound guidance (deemed medically necessary). Painless injection, pt was awake and conversant during the procedure without complications. Needle and surrounding structures visualized throughout procedure. No adverse reactions or complications seen during this period. Post-procedure image showed no signs of complication, and anatomy was consistent with an uncomplicated nerve blockade.              Assessment and Plan     DX: S/P Left shoulder mini open rotator cuff repair SAD, biceps tendonesis    Discussed the treatment plan with the patient. I reviewed the surgery pictures with the patient. Plan to continue physical therapy and the sling at this time.     Call or return if worsening symptoms.    Follow Up     4 weeks      Patient was given instructions and counseling regarding his condition or for health maintenance advice. Please see specific information pulled into the AVS if appropriate.     Scribed for Jorgito Marquez MD by Petra Edmonds.  11/30/21   10:30 EST    I have personally performed the services described in this document as scribed by the above individual and it is both accurate and complete. Jorgito Marquez MD 11/30/21

## 2022-01-06 ENCOUNTER — OFFICE VISIT (OUTPATIENT)
Dept: ORTHOPEDIC SURGERY | Facility: CLINIC | Age: 57
End: 2022-01-06

## 2022-01-06 VITALS — WEIGHT: 300 LBS | BODY MASS INDEX: 42 KG/M2 | HEIGHT: 71 IN

## 2022-01-06 DIAGNOSIS — Z98.890 S/P ARTHROSCOPY OF LEFT SHOULDER: Primary | ICD-10-CM

## 2022-01-06 PROCEDURE — 99024 POSTOP FOLLOW-UP VISIT: CPT | Performed by: ORTHOPAEDIC SURGERY

## 2022-01-06 NOTE — PROGRESS NOTES
"Chief Complaint  Pain of the Left Shoulder     Subjective      Gerald Escalera presents to [facility] for follow-up of left shoulder. The patient is S/P Left shoulder mini open rotator cuff repair SAD, biceps tendonesis 11/15/2021.  He has been making progress in therapy twice weekly. He reports the shoulder is painful but is also somewhat different than the other shoulder was. No new complaints.     No Known Allergies     Social History     Socioeconomic History   • Marital status:    Tobacco Use   • Smoking status: Never Smoker   • Smokeless tobacco: Never Used   Vaping Use   • Vaping Use: Never used   Substance and Sexual Activity   • Alcohol use: Not Currently     Alcohol/week: 0.0 standard drinks   • Drug use: Never   • Sexual activity: Defer        Review of Systems     Objective   Vital Signs:   Ht 180.3 cm (71\")   Wt 136 kg (300 lb)   BMI 41.84 kg/m²       Physical Exam  Constitutional:       Appearance: Normal appearance. The patient is well-developed and normal weight.   HENT:      Head: Normocephalic.      Right Ear: Hearing and external ear normal.      Left Ear: Hearing and external ear normal.      Nose: Nose normal.   Eyes:      Conjunctiva/sclera: Conjunctivae normal.   Cardiovascular:      Rate and Rhythm: Normal rate.   Pulmonary:      Effort: Pulmonary effort is normal.      Breath sounds: No wheezing or rales.   Abdominal:      Palpations: Abdomen is soft.      Tenderness: There is no abdominal tenderness.   Musculoskeletal:      Cervical back: Normal range of motion.   Skin:     Findings: No rash.   Neurological:      Mental Status: The patient is alert and oriented to person, place, and time.   Psychiatric:         Mood and Affect: Mood and affect normal.         Judgment: Judgment normal.       Ortho Exam      Left shoulder- incisions well-healing. No signs of infection. Neurovascularly intact. Sensation to light touch median, radial, ulnar nerve. " Positive AIN, PIN, ulnar nerve. Positive pulses. Good strength in triceps, biceps, deltoid, wrist extensors and wrist flexors.  degrees, abduction 130 degrees, ER 50 and IR to 45 degrees. 4+ supraspinatus, 5/5 infraspinatus and subscapularis.    Procedures      Imaging Results (Most Recent)     None           Result Review :          Assessment and Plan     DX: Status-post left shoulder mini open rotator cuff repair        Follow Up     Patient has been doing well overall and takes Motrin at night as needed. He will continue with therapy. Follow-up 6 weeks to recheck ROM. He will start to wean out of the sling.       Patient was given instructions and counseling regarding his condition or for health maintenance advice. Please see specific information pulled into the AVS if appropriate.     Scribed for Jorgito Marquez MD by Anabella Lopez.  01/06/22   09:48 EST    I have personally performed the services described in this document as scribed by the above individual and it is both accurate and complete. Jorgito Marquez MD 01/08/22

## 2022-02-17 ENCOUNTER — OFFICE VISIT (OUTPATIENT)
Dept: ORTHOPEDIC SURGERY | Facility: CLINIC | Age: 57
End: 2022-02-17

## 2022-02-17 VITALS — BODY MASS INDEX: 42 KG/M2 | HEIGHT: 71 IN | WEIGHT: 300 LBS

## 2022-02-17 DIAGNOSIS — Z98.890 S/P ARTHROSCOPY OF LEFT SHOULDER: Primary | ICD-10-CM

## 2022-02-17 PROCEDURE — 99213 OFFICE O/P EST LOW 20 MIN: CPT | Performed by: ORTHOPAEDIC SURGERY

## 2022-02-17 NOTE — PROGRESS NOTES
"Chief Complaint  Pain of the Left Shoulder     Subjective      Gerald Escalera presents to [facility] for follow-up of left shoulder. The patient is S/P Left shoulder mini open rotator cuff repair SAD, biceps tendonesis 11/15/2021. He has been making progress with physical therapy. He is overall doing well. He does still have some soreness at night that is managed with ibuprofen. He has no new complaints.     No Known Allergies     Social History     Socioeconomic History   • Marital status:    Tobacco Use   • Smoking status: Never Smoker   • Smokeless tobacco: Never Used   Vaping Use   • Vaping Use: Never used   Substance and Sexual Activity   • Alcohol use: Not Currently     Alcohol/week: 0.0 standard drinks   • Drug use: Never   • Sexual activity: Defer        Review of Systems     Objective   Vital Signs:   Ht 180.3 cm (71\")   Wt 136 kg (300 lb)   BMI 41.84 kg/m²       Physical Exam  Constitutional:       Appearance: Normal appearance. The patient is well-developed and normal weight.   HENT:      Head: Normocephalic.      Right Ear: Hearing and external ear normal.      Left Ear: Hearing and external ear normal.      Nose: Nose normal.   Eyes:      Conjunctiva/sclera: Conjunctivae normal.   Cardiovascular:      Rate and Rhythm: Normal rate.   Pulmonary:      Effort: Pulmonary effort is normal.      Breath sounds: No wheezing or rales.   Abdominal:      Palpations: Abdomen is soft.      Tenderness: There is no abdominal tenderness.   Musculoskeletal:      Cervical back: Normal range of motion.   Skin:     Findings: No rash.   Neurological:      Mental Status: The patient is alert and oriented to person, place, and time.   Psychiatric:         Mood and Affect: Mood and affect normal.         Judgment: Judgment normal.       Ortho Exam      Left shoulder- scars well healed. Neurovascularly intact. Sensation to light touch median, radial, ulnar nerve. Positive AIN, PIN, ulnar " nerve. Positive pulses. Elbow ROM intact. Forward elevation 180. Abduction 160. External Rotation 80. Internal rotation 60. 4+ supraspinatus strength. 5/5 infraspinatus  And subscapularis     Procedures      Imaging Results (Most Recent)     None           Result Review :       No results found.           Assessment and Plan     DX: S/P Left shoulder mini open rotator cuff repair SAD, biceps tendonesis    Discussed the treatment plan with the patient.  Plan to continue physical therapy to work on strengthening and transition to home exercises.  He can gradually increase his activity with the shoulder.     Call or return if worsening symptoms.    Follow Up     6-8 weeks      Patient was given instructions and counseling regarding his condition or for health maintenance advice. Please see specific information pulled into the AVS if appropriate.     Scribed for Jorgito Marquez MD by Petra Edmonds.  02/17/22   09:14 EST    I have personally performed the services described in this document as scribed by the above individual and it is both accurate and complete. Jorgito Marquez MD 02/17/22

## 2022-03-01 DIAGNOSIS — M25.512 LEFT SHOULDER PAIN, UNSPECIFIED CHRONICITY: ICD-10-CM

## 2022-03-01 RX ORDER — MELOXICAM 15 MG/1
TABLET ORAL
Qty: 30 TABLET | Refills: 2 | Status: SHIPPED | OUTPATIENT
Start: 2022-03-01 | End: 2022-08-23

## 2022-05-19 ENCOUNTER — OFFICE VISIT (OUTPATIENT)
Dept: ORTHOPEDIC SURGERY | Facility: CLINIC | Age: 57
End: 2022-05-19

## 2022-05-19 VITALS — BODY MASS INDEX: 42 KG/M2 | WEIGHT: 300 LBS | OXYGEN SATURATION: 94 % | HEART RATE: 84 BPM | HEIGHT: 71 IN

## 2022-05-19 DIAGNOSIS — Z98.890 S/P ARTHROSCOPY OF LEFT SHOULDER: Primary | ICD-10-CM

## 2022-05-19 PROCEDURE — 99213 OFFICE O/P EST LOW 20 MIN: CPT | Performed by: PHYSICIAN ASSISTANT

## 2022-05-19 NOTE — PROGRESS NOTES
"Chief Complaint  Pain and Follow-up of the Left Shoulder    Subjective          Gerald Escalera is a 56 y.o. male  presents to [facility] for   History of Present Illness      Patient presents for follow-up evaluation of left shoulder arthroscopic subacromial decompression, biceps tenodesis, mini open rotator cuff repair, 11/15/2021.  Patient states that he has been doing well he states he stopped physical therapy about 4 weeks ago he states that he is happy with his range of motion and strength.  He does state that he has pain at night he points to the anterior lateral shoulder as his area of pain.  He states that he started doing squats recently had a gym and resting the squat bar in his shoulders has caused some of his pain.  He takes meloxicam over the last 3 weeks with some relief.  He states he has full range of motion and strength, he is happy with his results.      No Known Allergies     Social History     Socioeconomic History   • Marital status:    Tobacco Use   • Smoking status: Never Smoker   • Smokeless tobacco: Never Used   Vaping Use   • Vaping Use: Never used   Substance and Sexual Activity   • Alcohol use: Not Currently     Alcohol/week: 0.0 standard drinks   • Drug use: Never   • Sexual activity: Defer        REVIEW OF SYSTEMS    Constitutional: Denies fevers, chills, weight loss  Cardiovascular: Denies chest pain, shortness of breath  Skin: Denies rashes, acute skin changes  Neurologic: Denies headache, loss of consciousness  MSK: Left shoulder pain      Objective   Vital Signs:   Pulse 84   Ht 180.3 cm (71\")   Wt 136 kg (300 lb)   SpO2 94%   BMI 41.84 kg/m²     Body mass index is 41.84 kg/m².    Physical Exam    Left shoulder: Well-healed incisions, no erythema, no ecchymosis, no swelling, no signs of infection, nontender to palpation, active forward elevation 180, active abduction 150, X rotation with abduction 90, internal rotation to T10, 5 out of 5 " rotator cuff strength, neurovascularly intact.    Procedures    Imaging Results (Most Recent)     None           Result Review :   The following data was reviewed by: VIKRAM Myles on 05/19/2022:               Assessment and Plan    Diagnoses and all orders for this visit:    1. S/P Left shoulder mini open rotator cuff repair SAD, biceps tendonesis 11/15/2021 (Primary)        Discussed diagnosis and treatment options with the patient, he will continue meloxicam, continue home exercises, if any new or concerning symptoms occur follow-up sooner otherwise follow-up as needed, patient agreed with plan.    Call or return if worsening symptoms.    Follow Up   Return if symptoms worsen or fail to improve.  Patient was given instructions and counseling regarding his condition or for health maintenance advice. Please see specific information pulled into the AVS if appropriate.

## 2022-08-23 DIAGNOSIS — U07.1 COVID-19 VIRUS INFECTION: Primary | ICD-10-CM

## 2022-08-23 DIAGNOSIS — M25.512 LEFT SHOULDER PAIN, UNSPECIFIED CHRONICITY: ICD-10-CM

## 2022-08-23 RX ORDER — BENZONATATE 100 MG/1
100 CAPSULE ORAL 3 TIMES DAILY PRN
Qty: 60 CAPSULE | Refills: 0 | Status: SHIPPED | OUTPATIENT
Start: 2022-08-23 | End: 2023-02-16

## 2022-08-23 RX ORDER — MELOXICAM 15 MG/1
TABLET ORAL
Qty: 90 TABLET | Refills: 1 | Status: SHIPPED | OUTPATIENT
Start: 2022-08-23 | End: 2023-02-16

## 2022-08-23 RX ORDER — DEXAMETHASONE 6 MG/1
6 TABLET ORAL 2 TIMES DAILY WITH MEALS
Qty: 10 TABLET | Refills: 0 | Status: SHIPPED | OUTPATIENT
Start: 2022-08-23 | End: 2022-12-15

## 2022-12-15 ENCOUNTER — LAB (OUTPATIENT)
Dept: LAB | Facility: HOSPITAL | Age: 57
End: 2022-12-15

## 2022-12-15 ENCOUNTER — OFFICE VISIT (OUTPATIENT)
Dept: FAMILY MEDICINE CLINIC | Facility: CLINIC | Age: 57
End: 2022-12-15

## 2022-12-15 VITALS
WEIGHT: 315 LBS | SYSTOLIC BLOOD PRESSURE: 135 MMHG | OXYGEN SATURATION: 96 % | HEIGHT: 71 IN | HEART RATE: 80 BPM | DIASTOLIC BLOOD PRESSURE: 93 MMHG | BODY MASS INDEX: 44.1 KG/M2

## 2022-12-15 DIAGNOSIS — Z23 FLU VACCINE NEED: ICD-10-CM

## 2022-12-15 DIAGNOSIS — M54.50 CHRONIC MIDLINE LOW BACK PAIN, UNSPECIFIED WHETHER SCIATICA PRESENT: Primary | ICD-10-CM

## 2022-12-15 DIAGNOSIS — M79.2 NERVE PAIN: ICD-10-CM

## 2022-12-15 DIAGNOSIS — Z01.89 ROUTINE LAB DRAW: ICD-10-CM

## 2022-12-15 DIAGNOSIS — G89.29 CHRONIC MIDLINE LOW BACK PAIN, UNSPECIFIED WHETHER SCIATICA PRESENT: Primary | ICD-10-CM

## 2022-12-15 DIAGNOSIS — Z13.6 ENCOUNTER FOR LIPID SCREENING FOR CARDIOVASCULAR DISEASE: ICD-10-CM

## 2022-12-15 DIAGNOSIS — Z13.220 ENCOUNTER FOR LIPID SCREENING FOR CARDIOVASCULAR DISEASE: ICD-10-CM

## 2022-12-15 DIAGNOSIS — R20.0 NUMBNESS: ICD-10-CM

## 2022-12-15 DIAGNOSIS — M25.561 ACUTE PAIN OF RIGHT KNEE: ICD-10-CM

## 2022-12-15 DIAGNOSIS — Z13.29 SCREENING FOR THYROID DISORDER: ICD-10-CM

## 2022-12-15 PROBLEM — E66.813 CLASS 3 SEVERE OBESITY DUE TO EXCESS CALORIES WITHOUT SERIOUS COMORBIDITY WITH BODY MASS INDEX (BMI) OF 40.0 TO 44.9 IN ADULT: Status: ACTIVE | Noted: 2022-12-15

## 2022-12-15 PROBLEM — E66.01 CLASS 3 SEVERE OBESITY DUE TO EXCESS CALORIES WITHOUT SERIOUS COMORBIDITY WITH BODY MASS INDEX (BMI) OF 40.0 TO 44.9 IN ADULT (HCC): Status: ACTIVE | Noted: 2022-12-15

## 2022-12-15 LAB
ALBUMIN SERPL-MCNC: 4.5 G/DL (ref 3.5–5.2)
ALBUMIN/GLOB SERPL: 1.8 G/DL
ALP SERPL-CCNC: 58 U/L (ref 39–117)
ALT SERPL W P-5'-P-CCNC: 21 U/L (ref 1–41)
ANION GAP SERPL CALCULATED.3IONS-SCNC: 10 MMOL/L (ref 5–15)
AST SERPL-CCNC: 22 U/L (ref 1–40)
BILIRUB SERPL-MCNC: 0.5 MG/DL (ref 0–1.2)
BUN SERPL-MCNC: 17 MG/DL (ref 6–20)
BUN/CREAT SERPL: 13.9 (ref 7–25)
CALCIUM SPEC-SCNC: 9.4 MG/DL (ref 8.6–10.5)
CHLORIDE SERPL-SCNC: 100 MMOL/L (ref 98–107)
CHOLEST SERPL-MCNC: 242 MG/DL (ref 0–200)
CO2 SERPL-SCNC: 27 MMOL/L (ref 22–29)
CREAT SERPL-MCNC: 1.22 MG/DL (ref 0.76–1.27)
EGFRCR SERPLBLD CKD-EPI 2021: 69.1 ML/MIN/1.73
GLOBULIN UR ELPH-MCNC: 2.5 GM/DL
GLUCOSE SERPL-MCNC: 116 MG/DL (ref 65–99)
HDLC SERPL-MCNC: 38 MG/DL (ref 40–60)
LDLC SERPL CALC-MCNC: 140 MG/DL (ref 0–100)
LDLC/HDLC SERPL: 3.53 {RATIO}
POTASSIUM SERPL-SCNC: 4.1 MMOL/L (ref 3.5–5.2)
PROT SERPL-MCNC: 7 G/DL (ref 6–8.5)
SODIUM SERPL-SCNC: 137 MMOL/L (ref 136–145)
TRIGL SERPL-MCNC: 350 MG/DL (ref 0–150)
TSH SERPL DL<=0.05 MIU/L-ACNC: 2.96 UIU/ML (ref 0.27–4.2)
VLDLC SERPL-MCNC: 64 MG/DL (ref 5–40)

## 2022-12-15 PROCEDURE — 80061 LIPID PANEL: CPT

## 2022-12-15 PROCEDURE — 90686 IIV4 VACC NO PRSV 0.5 ML IM: CPT

## 2022-12-15 PROCEDURE — 36415 COLL VENOUS BLD VENIPUNCTURE: CPT

## 2022-12-15 PROCEDURE — 99213 OFFICE O/P EST LOW 20 MIN: CPT

## 2022-12-15 PROCEDURE — 80050 GENERAL HEALTH PANEL: CPT

## 2022-12-15 PROCEDURE — 90471 IMMUNIZATION ADMIN: CPT

## 2022-12-15 NOTE — PROGRESS NOTES
Chief Complaint  Establish Care, Back Pain (Patient would like to discuss mid upper back pain. Pain is described as sharp, patient states at times it will travel to both arms stopping at the elbows. Numbness from the ribs to the hips.  LB and bilateral hip pain is dull achy pain travels down  the anterior aspect of the leg stopping at the knees and then again the the bottom of the feet ), and Knee Pain (Right knee pain for about 6 to 8 weeks.  Patient states he is not sure if stepping out of his truck has triggered pain )    SUBJECTIVE  Gerald Escalera presents to [facility]     History of Present Illness  57-year-old Gerald Escalera presents today to establish care and to discuss mid upper and lower back pain.  Patient does state that he has a history of rotator cuff repair and was seen by Dr. Marquez.  Patient was previously seen by PCP Pravin Price.    Mid to upper back pain and lower back pain: Patient states that the pain is sharp and describes it as a nerve pain.  Patient states that at times he will have numbness around the ribs to hip area.  Patient states that at times the pain will radiate down his legs to his feet.  Pain has been present since June but patient was unable to schedule an appointment.    Right knee pain: Patient is also complaining about right knee pain that has been present for about 6 to 8 weeks states that he has been using Tylenol arthritis for the pain which helps some with pain management.  He does not believe that he injured the extremity but states that he has been walking differently with the lower back pain and does not know if this caused stress on the area or if it could something else.  Patient also states that he had been stepping out of his truck and noticed the pain but is unsure if he injured the area at that time.    Patient declines shingles vaccine, COVID-vaccine and Tdap at this time.    Past Medical History:   Diagnosis Date   • Complete  "tear of left rotator cuff    • Rotator cuff syndrome       Family History   Problem Relation Age of Onset   • Malig Hyperthermia Neg Hx       Past Surgical History:   Procedure Laterality Date   • CHOLECYSTECTOMY     • ROTATOR CUFF REPAIR Right    • SHOULDER ARTHROSCOPY W/ ROTATOR CUFF REPAIR Left 11/15/2021    Procedure: LEFT SHOULDER ARTHROSCOPyY , MINI ROTATOR CUFF REPAIR,SUBACROMINAL DECOMPRESSION,  BICEPS TENDODESIS;  Surgeon: Jorgito Marquez MD;  Location: [facility] OR [facility] [place], [place];  Service: Orthopedics;  Laterality: Left;   • SHOULDER SURGERY          Current Outpatient Medications:   •  benzonatate (Tessalon Perles) 100 MG capsule, Take 1 capsule by mouth 3 (Three) Times a Day As Needed for Cough., Disp: 60 capsule, Rfl: 0  •  meloxicam (MOBIC) 15 MG tablet, TAKE ONE TABLET BY MOUTH DAILY, Disp: 90 tablet, Rfl: 1  •  oxyCODONE-acetaminophen (PERCOCET) 7.5-325 MG per tablet, Take 1-2 tablets by mouth Every 4 (Four) Hours As Needed for Moderate Pain ., Disp: 40 tablet, Rfl: 0    OBJECTIVE  Vital Signs:   /93 (BP Location: Right arm)   Pulse 80   Ht 180.3 cm (71\")   Wt (!) 143 kg (315 lb)   SpO2 96%   BMI 43.93 kg/m²    Estimated body mass index is 43.93 kg/m² as calculated from the following:    Height as of this encounter: 180.3 cm (71\").    Weight as of this encounter: 143 kg (315 lb).     Wt Readings from Last 3 Encounters:   12/15/22 (!) 143 kg (315 lb)   05/19/22 136 kg (300 lb)   02/17/22 136 kg (300 lb)     BP Readings from Last 3 Encounters:   12/15/22 135/93   11/15/21 125/81   01/27/20 135/80       Physical Exam  Vitals and nursing note reviewed.   Constitutional:       Appearance: Normal appearance. He is obese.   HENT:      Head: Normocephalic and atraumatic.   Eyes:      Conjunctiva/sclera: Conjunctivae normal.      Pupils: Pupils are equal, round, and reactive to light.   Cardiovascular:      Rate and Rhythm: Normal rate and regular rhythm.      Pulses: Normal pulses.      Heart sounds: " Normal heart sounds.   Pulmonary:      Effort: Pulmonary effort is normal.      Breath sounds: Normal breath sounds.   Abdominal:      General: Abdomen is flat.      Palpations: Abdomen is soft.   Musculoskeletal:         General: Normal range of motion.      Cervical back: Normal range of motion and neck supple.      Right lower leg: Edema present.      Left lower leg: Edema present.   Skin:     General: Skin is warm and dry.   Neurological:      General: No focal deficit present.      Mental Status: He is alert and oriented to person, place, and time. Mental status is at baseline.   Psychiatric:         Mood and Affect: Mood normal.         Behavior: Behavior normal.         Thought Content: Thought content normal.         Judgment: Judgment normal.           Patient Care Team:  Yen Santo APRN as PCP - General (Emergency Medicine)           ASSESSMENT & PLAN    Diagnoses and all orders for this visit:    1. Chronic midline low back pain, unspecified whether sciatica present (Primary)  Comments:  Patient will obtain a cervical spine and lower spine MRI as he complains of numbness and nerve pain.  Patient also referred to pain management.  Orders:  -     MRI Cervical Spine Without Contrast; Future  -     MRI Lumbar Spine With & Without Contrast; Future    2. Nerve pain  -     Ambulatory Referral to Pain Management  -     MRI Cervical Spine Without Contrast; Future  -     MRI Lumbar Spine With & Without Contrast; Future    3. Acute pain of right knee  Comments:  Will obtain x-ray of right knee.  Orders:  -     XR Knee 3 View Right; Future    4. Screening for thyroid disorder  Comments:  Will screen for thyroid disorders with labs that have been ordered for today  Orders:  -     TSH; Future    5. Encounter for lipid screening for cardiovascular disease  Comments:  We will screen lipid levels with labs that were ordered today  Orders:  -     Lipid Panel; Future    6. Routine lab draw  -     Comprehensive  Metabolic Panel; Future  -     CBC & Differential; Future    7. Flu vaccine need  -     FluLaval/Fluzone >6 mos ([id number])    8. Numbness  -     Ambulatory Referral to Pain Management  -     MRI Cervical Spine Without Contrast; Future  -     MRI Lumbar Spine With & Without Contrast; Future         Tobacco Use: Low Risk    • Smoking Tobacco Use: Never   • Smokeless Tobacco Use: Never   • Passive Exposure: Not on file       Follow Up     Return in about 3 months (around 3/15/2023) for Next scheduled follow up, Recheck.        Patient was given instructions and counseling regarding his condition or for health maintenance advice. Please see specific information pulled into the AVS if appropriate.   I have reviewed information obtained and documented by others and I have confirmed the accuracy of this documented note.    ISMAEL Merino

## 2022-12-16 ENCOUNTER — HOSPITAL ENCOUNTER (OUTPATIENT)
Dept: GENERAL RADIOLOGY | Facility: HOSPITAL | Age: 57
Discharge: HOME | End: 2022-12-16

## 2022-12-16 ENCOUNTER — TELEPHONE (OUTPATIENT)
Dept: FAMILY MEDICINE CLINIC | Facility: CLINIC | Age: 57
End: 2022-12-16

## 2022-12-16 DIAGNOSIS — M25.561 ACUTE PAIN OF RIGHT KNEE: ICD-10-CM

## 2022-12-16 DIAGNOSIS — R73.09 ELEVATED GLUCOSE: ICD-10-CM

## 2022-12-16 DIAGNOSIS — E78.5 HYPERLIPIDEMIA, UNSPECIFIED HYPERLIPIDEMIA TYPE: Primary | ICD-10-CM

## 2022-12-16 LAB
BASOPHILS # BLD AUTO: 0.11 10*3/MM3 (ref 0–0.2)
BASOPHILS NFR BLD AUTO: 1.6 % (ref 0–1.5)
DEPRECATED RDW RBC AUTO: 37.6 FL (ref 37–54)
EOSINOPHIL # BLD AUTO: 0.16 10*3/MM3 (ref 0–0.4)
EOSINOPHIL NFR BLD AUTO: 2.3 % (ref 0.3–6.2)
ERYTHROCYTE [DISTWIDTH] IN BLOOD BY AUTOMATED COUNT: 12.1 % (ref 12.3–15.4)
HCT VFR BLD AUTO: 42.7 % (ref 37.5–51)
HGB BLD-MCNC: 15.3 G/DL (ref 13–17.7)
IMM GRANULOCYTES # BLD AUTO: 0.04 10*3/MM3 (ref 0–0.05)
IMM GRANULOCYTES NFR BLD AUTO: 0.6 % (ref 0–0.5)
LYMPHOCYTES # BLD AUTO: 1.81 10*3/MM3 (ref 0.7–3.1)
LYMPHOCYTES NFR BLD AUTO: 26 % (ref 19.6–45.3)
MCH RBC QN AUTO: 30.6 PG (ref 26.6–33)
MCHC RBC AUTO-ENTMCNC: 35.8 G/DL (ref 31.5–35.7)
MCV RBC AUTO: 85.4 FL (ref 79–97)
MONOCYTES # BLD AUTO: 0.58 10*3/MM3 (ref 0.1–0.9)
MONOCYTES NFR BLD AUTO: 8.3 % (ref 5–12)
NEUTROPHILS NFR BLD AUTO: 4.27 10*3/MM3 (ref 1.7–7)
NEUTROPHILS NFR BLD AUTO: 61.2 % (ref 42.7–76)
NRBC BLD AUTO-RTO: 0 /100 WBC (ref 0–0.2)
PLATELET # BLD AUTO: 286 10*3/MM3 (ref 140–450)
PMV BLD AUTO: 10.2 FL (ref 6–12)
RBC # BLD AUTO: 5 10*6/MM3 (ref 4.14–5.8)
WBC NRBC COR # BLD: 6.97 10*3/MM3 (ref 3.4–10.8)

## 2022-12-16 PROCEDURE — 73562 X-RAY EXAM OF KNEE 3: CPT

## 2022-12-16 RX ORDER — ATORVASTATIN CALCIUM 10 MG/1
10 TABLET, FILM COATED ORAL DAILY
Qty: 90 TABLET | Refills: 0 | Status: SHIPPED | OUTPATIENT
Start: 2022-12-16 | End: 2023-02-16

## 2022-12-19 ENCOUNTER — TELEPHONE (OUTPATIENT)
Dept: FAMILY MEDICINE CLINIC | Facility: CLINIC | Age: 57
End: 2022-12-19

## 2023-01-11 ENCOUNTER — HOSPITAL ENCOUNTER (OUTPATIENT)
Dept: MRI IMAGING | Facility: HOSPITAL | Age: 58
Discharge: HOME | End: 2023-01-11
Payer: COMMERCIAL

## 2023-01-11 DIAGNOSIS — G89.29 CHRONIC MIDLINE LOW BACK PAIN, UNSPECIFIED WHETHER SCIATICA PRESENT: ICD-10-CM

## 2023-01-11 DIAGNOSIS — M79.2 NERVE PAIN: ICD-10-CM

## 2023-01-11 DIAGNOSIS — R20.0 NUMBNESS: ICD-10-CM

## 2023-01-11 DIAGNOSIS — M54.50 CHRONIC MIDLINE LOW BACK PAIN, UNSPECIFIED WHETHER SCIATICA PRESENT: ICD-10-CM

## 2023-01-11 PROCEDURE — 72141 MRI NECK SPINE W/O DYE: CPT

## 2023-01-11 PROCEDURE — 72158 MRI LUMBAR SPINE W/O & W/DYE: CPT

## 2023-01-11 PROCEDURE — 0 GADOBENATE DIMEGLUMINE 529 MG/ML SOLUTION

## 2023-01-11 PROCEDURE — A9577 INJ MULTIHANCE: HCPCS

## 2023-01-11 RX ADMIN — GADOBENATE DIMEGLUMINE 20 ML: 529 INJECTION, SOLUTION INTRAVENOUS at 15:59

## 2023-01-12 ENCOUNTER — TELEPHONE (OUTPATIENT)
Dept: FAMILY MEDICINE CLINIC | Facility: CLINIC | Age: 58
End: 2023-01-12
Payer: COMMERCIAL

## 2023-01-12 DIAGNOSIS — M54.50 CHRONIC MIDLINE LOW BACK PAIN, UNSPECIFIED WHETHER SCIATICA PRESENT: Primary | ICD-10-CM

## 2023-01-12 DIAGNOSIS — G89.29 CHRONIC MIDLINE LOW BACK PAIN, UNSPECIFIED WHETHER SCIATICA PRESENT: Primary | ICD-10-CM

## 2023-01-12 NOTE — TELEPHONE ENCOUNTER
Patient called today and said he went to pain management today and they cant do anything for him.  They recommend patient seeing neurosurgery.  Referral is placed for neuro.  Pain management also recommends he have MRI of the Tspine

## 2023-01-13 ENCOUNTER — TRANSCRIBE ORDERS (OUTPATIENT)
Dept: ADMINISTRATIVE | Facility: HOSPITAL | Age: 58
End: 2023-01-13
Payer: COMMERCIAL

## 2023-01-13 DIAGNOSIS — M47.814 THORACIC SPONDYLOSIS WITHOUT MYELOPATHY: Primary | ICD-10-CM

## 2023-01-16 ENCOUNTER — HOSPITAL ENCOUNTER (OUTPATIENT)
Dept: MRI IMAGING | Facility: HOSPITAL | Age: 58
Discharge: HOME | End: 2023-01-16
Payer: COMMERCIAL

## 2023-01-16 DIAGNOSIS — M47.814 THORACIC SPONDYLOSIS WITHOUT MYELOPATHY: ICD-10-CM

## 2023-01-17 ENCOUNTER — HOSPITAL ENCOUNTER (OUTPATIENT)
Dept: MRI IMAGING | Facility: HOSPITAL | Age: 58
Discharge: HOME | End: 2023-01-17
Admitting: STUDENT IN AN ORGANIZED HEALTH CARE EDUCATION/TRAINING PROGRAM
Payer: COMMERCIAL

## 2023-01-17 PROCEDURE — 72146 MRI CHEST SPINE W/O DYE: CPT

## 2023-01-24 ENCOUNTER — OFFICE VISIT (OUTPATIENT)
Dept: NEUROSURGERY | Facility: CLINIC | Age: 58
End: 2023-01-24
Payer: COMMERCIAL

## 2023-01-24 VITALS
HEIGHT: 71 IN | DIASTOLIC BLOOD PRESSURE: 74 MMHG | WEIGHT: 314 LBS | SYSTOLIC BLOOD PRESSURE: 133 MMHG | HEART RATE: 70 BPM | BODY MASS INDEX: 43.96 KG/M2

## 2023-01-24 DIAGNOSIS — M54.2 CERVICALGIA: ICD-10-CM

## 2023-01-24 DIAGNOSIS — M50.223 HERNIATED NUCLEUS PULPOSUS, C6-7: Primary | ICD-10-CM

## 2023-01-24 PROBLEM — M75.100 ROTATOR CUFF SYNDROME: Status: ACTIVE | Noted: 2023-01-24

## 2023-01-24 PROCEDURE — 99214 OFFICE O/P EST MOD 30 MIN: CPT | Performed by: PHYSICIAN ASSISTANT

## 2023-01-24 NOTE — PROGRESS NOTES
"Chief Complaint  Neck Pain (Neck to between shoulder blades), Arm Pain (Bilateral to elbow when sneezing/coughing ), Back Pain, and Numbness (Bilateral legs to feet )    Subjective          Gerald LAQUITA Escalera who is a 57 y.o. year old male who presents to [facility] for Evaluation of the Spine.     The patient complains of pain located in the Cervical Spine.  Patients states the pain has been present for 6 months.  The pain came on gradually.  The pain scaled level is 0.  The pain does radiate. Dermatomes are located bilaterally Cervical at: to the elbows after coughing or sneezing..  The pain is Intermittent and described as sharp.  The pain is worse at no particular time of day. Patient states moving sometimes makes the pain better.  Patient states coughing, sneezing makes the pain worse.    He does also have pain in the lower back. This has been present for the past 6 months. Rated 2/10 today. The back pain is more or less persistent. He does report pain down the left leg intermittently to the knee, but this is not very often. The pain is sharp.    Associated Symptoms Include: Numbness and Tingling in both arms and numbness from just below the breasts and down the whole body, including the bilateral legs. He denies loss of bowel or bladder control. He does report difficulty walking, with prolonged walking his legs seem to \"give out\", either the left or the right.  Conservative Interventions Include: None.    Was this the result of an injury or accident?: No    History of Previous Spinal Surgery?: No     reports that he has never smoked. He has never used smokeless tobacco.    Review of Systems   Musculoskeletal: Positive for back pain, neck pain and neck stiffness.   Neurological: Positive for numbness.        Objective   Vital Signs:   /74   Pulse 70   Ht 180.3 cm (71\")   Wt (!) 142 kg (314 lb)   BMI 43.79 kg/m²       Physical Exam  Constitutional:       " Appearance: Normal appearance. He is obese.   Pulmonary:      Effort: Pulmonary effort is normal.   Musculoskeletal:         General: No tenderness.      Cervical back: Normal range of motion.      Comments: Jarrett's negative bilaterally,  Clonus negative bilaterally,  Tinel's positive at bilateral wrists.   Neurological:      General: No focal deficit present.      Mental Status: He is alert and oriented to person, place, and time.      Sensory: No sensory deficit.      Motor: No weakness.      Deep Tendon Reflexes: Reflexes normal.   Psychiatric:         Mood and Affect: Mood normal.         Behavior: Behavior normal.        Neurologic Exam     Mental Status   Oriented to person, place, and time.        Result Review     I have personally reviewed the MRI of cervical spine without contrast from 1/11/2023 which shows a large central disc extrusion at the C6-C7 level with severe central canal narrowing and spinal cord compression.  There may be some minimal spinal cord signal change at this level. There may also be some mild signal change at the C5-C6 level.     Assessment and Plan    Diagnoses and all orders for this visit:    1. Herniated nucleus pulposus, C6-7 (Primary)    2. Cervicalgia    He has a large disc extrusion at C6-C7 with severe stenosis at this level of the spine. There may be some signal change in the spinal cord at both the C5-C6 and C6-C7 level.    He does not have any signs of myelopathy on his exam today.    He is going to discuss the findings with Dr. Kirkpatrick for further recommendation.    Follow Up   No follow-ups on file.  Patient was given instructions and counseling regarding his condition or for health maintenance advice. Please see specific information pulled into the AVS if appropriate.     We discussed a right approach for C5-6 and C6-7 ACDF. He has severe stenosis at C6-7 and likely some signal change. This might help the numbness and prevent spinal cord injury. He is going to discuss  this with his wife and let us know how he would like to proceed. He will avoid high risk activities.

## 2023-01-31 PROBLEM — M50.223 HERNIATED NUCLEUS PULPOSUS, C6-7: Status: ACTIVE | Noted: 2023-01-31

## 2023-02-17 ENCOUNTER — ANESTHESIA EVENT (OUTPATIENT)
Dept: PERIOP | Facility: HOSPITAL | Age: 58
End: 2023-02-17
Payer: COMMERCIAL

## 2023-02-20 ENCOUNTER — ANESTHESIA (OUTPATIENT)
Dept: PERIOP | Facility: HOSPITAL | Age: 58
End: 2023-02-20
Payer: COMMERCIAL

## 2023-02-20 ENCOUNTER — HOSPITAL ENCOUNTER (OUTPATIENT)
Facility: HOSPITAL | Age: 58
Discharge: HOME | End: 2023-02-21
Attending: NEUROLOGICAL SURGERY | Admitting: NEUROLOGICAL SURGERY
Payer: COMMERCIAL

## 2023-02-20 ENCOUNTER — APPOINTMENT (OUTPATIENT)
Dept: GENERAL RADIOLOGY | Facility: HOSPITAL | Age: 58
End: 2023-02-20
Payer: COMMERCIAL

## 2023-02-20 DIAGNOSIS — M50.223 HERNIATED NUCLEUS PULPOSUS, C6-7: ICD-10-CM

## 2023-02-20 PROCEDURE — 25010000002 CEFAZOLIN IN DEXTROSE 2-4 GM/100ML-% SOLUTION: Performed by: NEUROLOGICAL SURGERY

## 2023-02-20 PROCEDURE — 25010000002 HYDROMORPHONE 1 MG/ML SOLUTION: Performed by: NURSE ANESTHETIST, CERTIFIED REGISTERED

## 2023-02-20 PROCEDURE — 25010000002 PROPOFOL 10 MG/ML EMULSION: Performed by: NURSE ANESTHETIST, CERTIFIED REGISTERED

## 2023-02-20 PROCEDURE — 93005 ELECTROCARDIOGRAM TRACING: CPT | Performed by: NEUROLOGICAL SURGERY

## 2023-02-20 PROCEDURE — 22552 ARTHRD ANT NTRBD CERVICAL EA: CPT | Performed by: NEUROLOGICAL SURGERY

## 2023-02-20 PROCEDURE — C1713 ANCHOR/SCREW BN/BN,TIS/BN: HCPCS | Performed by: NEUROLOGICAL SURGERY

## 2023-02-20 PROCEDURE — 25010000002 DEXAMETHASONE PER 1 MG: Performed by: NURSE ANESTHETIST, CERTIFIED REGISTERED

## 2023-02-20 PROCEDURE — 22551 ARTHRD ANT NTRBDY CERVICAL: CPT | Performed by: SPECIALIST/TECHNOLOGIST, OTHER

## 2023-02-20 PROCEDURE — 25010000002 CEFAZOLIN PER 500 MG: Performed by: NEUROLOGICAL SURGERY

## 2023-02-20 PROCEDURE — 25010000002 ONDANSETRON PER 1 MG

## 2023-02-20 PROCEDURE — 22551 ARTHRD ANT NTRBDY CERVICAL: CPT | Performed by: NEUROLOGICAL SURGERY

## 2023-02-20 PROCEDURE — 94799 UNLISTED PULMONARY SVC/PX: CPT

## 2023-02-20 PROCEDURE — 0 HYDROMORPHONE 1 MG/ML SOLUTION

## 2023-02-20 PROCEDURE — 25010000002 MIDAZOLAM PER 1 MG: Performed by: ANESTHESIOLOGY

## 2023-02-20 PROCEDURE — 20931 SP BONE ALGRFT STRUCT ADD-ON: CPT | Performed by: NEUROLOGICAL SURGERY

## 2023-02-20 PROCEDURE — 25010000002 FENTANYL CITRATE (PF) 50 MCG/ML SOLUTION: Performed by: NURSE ANESTHETIST, CERTIFIED REGISTERED

## 2023-02-20 PROCEDURE — 22845 INSERT SPINE FIXATION DEVICE: CPT | Performed by: NEUROLOGICAL SURGERY

## 2023-02-20 PROCEDURE — 22845 INSERT SPINE FIXATION DEVICE: CPT | Performed by: SPECIALIST/TECHNOLOGIST, OTHER

## 2023-02-20 PROCEDURE — 76000 FLUOROSCOPY <1 HR PHYS/QHP: CPT

## 2023-02-20 PROCEDURE — 22552 ARTHRD ANT NTRBD CERVICAL EA: CPT | Performed by: SPECIALIST/TECHNOLOGIST, OTHER

## 2023-02-20 PROCEDURE — 25010000002 PROCHLORPERAZINE 10 MG/2ML SOLUTION: Performed by: ANESTHESIOLOGY

## 2023-02-20 DEVICE — ALLOGRFT SPINE CERV VERTIGRAFT WEDGE FZ 7DEG PRESERV 6.75MM: Type: IMPLANTABLE DEVICE | Site: SPINE CERVICAL | Status: FUNCTIONAL

## 2023-02-20 DEVICE — SCRW SKYLINE VAR SD 14MM: Type: IMPLANTABLE DEVICE | Site: SPINE CERVICAL | Status: FUNCTIONAL

## 2023-02-20 DEVICE — IMPLANTABLE DEVICE: Type: IMPLANTABLE DEVICE | Site: SPINE CERVICAL | Status: FUNCTIONAL

## 2023-02-20 RX ORDER — MORPHINE SULFATE 2 MG/ML
2 INJECTION, SOLUTION INTRAMUSCULAR; INTRAVENOUS EVERY 4 HOURS PRN
Status: DISCONTINUED | OUTPATIENT
Start: 2023-02-20 | End: 2023-02-21 | Stop reason: HOSPADM

## 2023-02-20 RX ORDER — BUPIVACAINE HYDROCHLORIDE AND EPINEPHRINE 5; 5 MG/ML; UG/ML
INJECTION, SOLUTION EPIDURAL; INTRACAUDAL; PERINEURAL AS NEEDED
Status: DISCONTINUED | OUTPATIENT
Start: 2023-02-20 | End: 2023-02-20 | Stop reason: HOSPADM

## 2023-02-20 RX ORDER — PROCHLORPERAZINE EDISYLATE 5 MG/ML
5 INJECTION INTRAMUSCULAR; INTRAVENOUS ONCE
Status: COMPLETED | OUTPATIENT
Start: 2023-02-20 | End: 2023-02-20

## 2023-02-20 RX ORDER — GLYCOPYRROLATE 0.2 MG/ML
0.2 INJECTION INTRAMUSCULAR; INTRAVENOUS
Status: COMPLETED | OUTPATIENT
Start: 2023-02-20 | End: 2023-02-20

## 2023-02-20 RX ORDER — PROPOFOL 10 MG/ML
VIAL (ML) INTRAVENOUS AS NEEDED
Status: DISCONTINUED | OUTPATIENT
Start: 2023-02-20 | End: 2023-02-20 | Stop reason: SURG

## 2023-02-20 RX ORDER — CEFAZOLIN SODIUM 2 G/100ML
2 INJECTION, SOLUTION INTRAVENOUS EVERY 8 HOURS
Status: COMPLETED | OUTPATIENT
Start: 2023-02-20 | End: 2023-02-21

## 2023-02-20 RX ORDER — SODIUM CHLORIDE, SODIUM LACTATE, POTASSIUM CHLORIDE, CALCIUM CHLORIDE 600; 310; 30; 20 MG/100ML; MG/100ML; MG/100ML; MG/100ML
9 INJECTION, SOLUTION INTRAVENOUS CONTINUOUS PRN
Status: DISCONTINUED | OUTPATIENT
Start: 2023-02-20 | End: 2023-02-20 | Stop reason: HOSPADM

## 2023-02-20 RX ORDER — LIDOCAINE HYDROCHLORIDE 20 MG/ML
INJECTION, SOLUTION EPIDURAL; INFILTRATION; INTRACAUDAL; PERINEURAL AS NEEDED
Status: DISCONTINUED | OUTPATIENT
Start: 2023-02-20 | End: 2023-02-20 | Stop reason: SURG

## 2023-02-20 RX ORDER — ROCURONIUM BROMIDE 10 MG/ML
INJECTION, SOLUTION INTRAVENOUS AS NEEDED
Status: DISCONTINUED | OUTPATIENT
Start: 2023-02-20 | End: 2023-02-20 | Stop reason: SURG

## 2023-02-20 RX ORDER — ACETAMINOPHEN 500 MG
1000 TABLET ORAL ONCE
Status: COMPLETED | OUTPATIENT
Start: 2023-02-20 | End: 2023-02-20

## 2023-02-20 RX ORDER — MAGNESIUM HYDROXIDE 1200 MG/15ML
LIQUID ORAL AS NEEDED
Status: DISCONTINUED | OUTPATIENT
Start: 2023-02-20 | End: 2023-02-20 | Stop reason: HOSPADM

## 2023-02-20 RX ORDER — PHENYLEPHRINE HCL IN 0.9% NACL 1 MG/10 ML
SYRINGE (ML) INTRAVENOUS AS NEEDED
Status: DISCONTINUED | OUTPATIENT
Start: 2023-02-20 | End: 2023-02-20 | Stop reason: SURG

## 2023-02-20 RX ORDER — SODIUM CHLORIDE 9 MG/ML
50 INJECTION, SOLUTION INTRAVENOUS CONTINUOUS
Status: DISCONTINUED | OUTPATIENT
Start: 2023-02-20 | End: 2023-02-21 | Stop reason: HOSPADM

## 2023-02-20 RX ORDER — ONDANSETRON 2 MG/ML
4 INJECTION INTRAMUSCULAR; INTRAVENOUS EVERY 6 HOURS PRN
Status: DISCONTINUED | OUTPATIENT
Start: 2023-02-20 | End: 2023-02-21 | Stop reason: HOSPADM

## 2023-02-20 RX ORDER — DEXAMETHASONE SODIUM PHOSPHATE 4 MG/ML
INJECTION, SOLUTION INTRA-ARTICULAR; INTRALESIONAL; INTRAMUSCULAR; INTRAVENOUS; SOFT TISSUE AS NEEDED
Status: DISCONTINUED | OUTPATIENT
Start: 2023-02-20 | End: 2023-02-20 | Stop reason: SURG

## 2023-02-20 RX ORDER — MIDAZOLAM HYDROCHLORIDE 1 MG/ML
2 INJECTION INTRAMUSCULAR; INTRAVENOUS ONCE
Status: COMPLETED | OUTPATIENT
Start: 2023-02-20 | End: 2023-02-20

## 2023-02-20 RX ORDER — FENTANYL CITRATE 50 UG/ML
INJECTION, SOLUTION INTRAMUSCULAR; INTRAVENOUS AS NEEDED
Status: DISCONTINUED | OUTPATIENT
Start: 2023-02-20 | End: 2023-02-20 | Stop reason: SURG

## 2023-02-20 RX ORDER — SCOLOPAMINE TRANSDERMAL SYSTEM 1 MG/1
1 PATCH, EXTENDED RELEASE TRANSDERMAL ONCE
Status: DISCONTINUED | OUTPATIENT
Start: 2023-02-20 | End: 2023-02-20

## 2023-02-20 RX ORDER — NALOXONE HCL 0.4 MG/ML
0.4 VIAL (ML) INJECTION
Status: DISCONTINUED | OUTPATIENT
Start: 2023-02-20 | End: 2023-02-21 | Stop reason: HOSPADM

## 2023-02-20 RX ORDER — CEFAZOLIN SODIUM IN 0.9 % NACL 3 G/100 ML
3 INTRAVENOUS SOLUTION, PIGGYBACK (ML) INTRAVENOUS EVERY 8 HOURS
Status: DISCONTINUED | OUTPATIENT
Start: 2023-02-20 | End: 2023-02-20

## 2023-02-20 RX ORDER — PROMETHAZINE HYDROCHLORIDE 12.5 MG/1
25 TABLET ORAL ONCE AS NEEDED
Status: DISCONTINUED | OUTPATIENT
Start: 2023-02-20 | End: 2023-02-20 | Stop reason: HOSPADM

## 2023-02-20 RX ORDER — OXYCODONE HYDROCHLORIDE AND ACETAMINOPHEN 5; 325 MG/1; MG/1
1 TABLET ORAL EVERY 4 HOURS PRN
Status: DISCONTINUED | OUTPATIENT
Start: 2023-02-20 | End: 2023-02-21 | Stop reason: HOSPADM

## 2023-02-20 RX ORDER — EPHEDRINE SULFATE 50 MG/ML
INJECTION, SOLUTION INTRAVENOUS AS NEEDED
Status: DISCONTINUED | OUTPATIENT
Start: 2023-02-20 | End: 2023-02-20 | Stop reason: SURG

## 2023-02-20 RX ORDER — DEXMEDETOMIDINE HYDROCHLORIDE 100 UG/ML
INJECTION, SOLUTION INTRAVENOUS AS NEEDED
Status: DISCONTINUED | OUTPATIENT
Start: 2023-02-20 | End: 2023-02-20 | Stop reason: SURG

## 2023-02-20 RX ORDER — SODIUM CHLORIDE, SODIUM LACTATE, POTASSIUM CHLORIDE, CALCIUM CHLORIDE 600; 310; 30; 20 MG/100ML; MG/100ML; MG/100ML; MG/100ML
INJECTION, SOLUTION INTRAVENOUS CONTINUOUS PRN
Status: DISCONTINUED | OUTPATIENT
Start: 2023-02-20 | End: 2023-02-20 | Stop reason: SURG

## 2023-02-20 RX ORDER — VASOPRESSIN 20 U/ML
INJECTION PARENTERAL AS NEEDED
Status: DISCONTINUED | OUTPATIENT
Start: 2023-02-20 | End: 2023-02-20 | Stop reason: SURG

## 2023-02-20 RX ORDER — ONDANSETRON 2 MG/ML
4 INJECTION INTRAMUSCULAR; INTRAVENOUS ONCE AS NEEDED
Status: DISCONTINUED | OUTPATIENT
Start: 2023-02-20 | End: 2023-02-20 | Stop reason: HOSPADM

## 2023-02-20 RX ORDER — ONDANSETRON 2 MG/ML
INJECTION INTRAMUSCULAR; INTRAVENOUS AS NEEDED
Status: DISCONTINUED | OUTPATIENT
Start: 2023-02-20 | End: 2023-02-20 | Stop reason: SURG

## 2023-02-20 RX ORDER — BISACODYL 10 MG
10 SUPPOSITORY, RECTAL RECTAL DAILY PRN
Status: DISCONTINUED | OUTPATIENT
Start: 2023-02-20 | End: 2023-02-21 | Stop reason: HOSPADM

## 2023-02-20 RX ORDER — SUCCINYLCHOLINE/SOD CL,ISO/PF 100 MG/5ML
SYRINGE (ML) INTRAVENOUS AS NEEDED
Status: DISCONTINUED | OUTPATIENT
Start: 2023-02-20 | End: 2023-02-20 | Stop reason: SURG

## 2023-02-20 RX ORDER — BISACODYL 5 MG/1
5 TABLET, DELAYED RELEASE ORAL DAILY PRN
Status: DISCONTINUED | OUTPATIENT
Start: 2023-02-20 | End: 2023-02-21 | Stop reason: HOSPADM

## 2023-02-20 RX ORDER — PROMETHAZINE HYDROCHLORIDE 25 MG/1
25 SUPPOSITORY RECTAL ONCE AS NEEDED
Status: DISCONTINUED | OUTPATIENT
Start: 2023-02-20 | End: 2023-02-20 | Stop reason: HOSPADM

## 2023-02-20 RX ORDER — OXYCODONE HYDROCHLORIDE 5 MG/1
5 TABLET ORAL
Status: DISCONTINUED | OUTPATIENT
Start: 2023-02-20 | End: 2023-02-20 | Stop reason: HOSPADM

## 2023-02-20 RX ORDER — OXYCODONE AND ACETAMINOPHEN 10; 325 MG/1; MG/1
1 TABLET ORAL EVERY 4 HOURS PRN
Status: DISCONTINUED | OUTPATIENT
Start: 2023-02-20 | End: 2023-02-21 | Stop reason: HOSPADM

## 2023-02-20 RX ORDER — CEFAZOLIN SODIUM IN 0.9 % NACL 3 G/100 ML
3 INTRAVENOUS SOLUTION, PIGGYBACK (ML) INTRAVENOUS ONCE
Status: COMPLETED | OUTPATIENT
Start: 2023-02-20 | End: 2023-02-20

## 2023-02-20 RX ADMIN — DEXMEDETOMIDINE HYDROCHLORIDE 10 MCG: 100 INJECTION, SOLUTION, CONCENTRATE INTRAVENOUS at 12:40

## 2023-02-20 RX ADMIN — ONDANSETRON 4 MG: 2 INJECTION INTRAMUSCULAR; INTRAVENOUS at 14:48

## 2023-02-20 RX ADMIN — SODIUM CHLORIDE, POTASSIUM CHLORIDE, SODIUM LACTATE AND CALCIUM CHLORIDE 9 ML/HR: 600; 310; 30; 20 INJECTION, SOLUTION INTRAVENOUS at 09:24

## 2023-02-20 RX ADMIN — Medication 100 MCG: at 13:43

## 2023-02-20 RX ADMIN — HYDROMORPHONE HYDROCHLORIDE 0.5 MG: 1 INJECTION, SOLUTION INTRAMUSCULAR; INTRAVENOUS; SUBCUTANEOUS at 16:04

## 2023-02-20 RX ADMIN — FENTANYL CITRATE 100 MCG: 50 INJECTION, SOLUTION INTRAMUSCULAR; INTRAVENOUS at 12:18

## 2023-02-20 RX ADMIN — DEXMEDETOMIDINE HYDROCHLORIDE 20 MCG: 100 INJECTION, SOLUTION, CONCENTRATE INTRAVENOUS at 12:26

## 2023-02-20 RX ADMIN — SODIUM CHLORIDE, POTASSIUM CHLORIDE, SODIUM LACTATE AND CALCIUM CHLORIDE: 600; 310; 30; 20 INJECTION, SOLUTION INTRAVENOUS at 14:19

## 2023-02-20 RX ADMIN — SODIUM CHLORIDE 50 ML/HR: 9 INJECTION, SOLUTION INTRAVENOUS at 17:11

## 2023-02-20 RX ADMIN — OXYCODONE AND ACETAMINOPHEN 1 TABLET: 5; 325 TABLET ORAL at 20:06

## 2023-02-20 RX ADMIN — EPHEDRINE SULFATE 10 MG: 50 INJECTION INTRAVENOUS at 13:43

## 2023-02-20 RX ADMIN — MIDAZOLAM HYDROCHLORIDE 2 MG: 1 INJECTION, SOLUTION INTRAMUSCULAR; INTRAVENOUS at 11:46

## 2023-02-20 RX ADMIN — Medication 100 MCG: at 14:03

## 2023-02-20 RX ADMIN — EPHEDRINE SULFATE 10 MG: 50 INJECTION INTRAVENOUS at 14:03

## 2023-02-20 RX ADMIN — LIDOCAINE HYDROCHLORIDE 100 MG: 20 INJECTION, SOLUTION EPIDURAL; INFILTRATION; INTRACAUDAL; PERINEURAL at 12:20

## 2023-02-20 RX ADMIN — Medication 200 MCG: at 13:30

## 2023-02-20 RX ADMIN — SODIUM CHLORIDE, POTASSIUM CHLORIDE, SODIUM LACTATE AND CALCIUM CHLORIDE: 600; 310; 30; 20 INJECTION, SOLUTION INTRAVENOUS at 12:30

## 2023-02-20 RX ADMIN — Medication 200 MCG: at 13:16

## 2023-02-20 RX ADMIN — EPHEDRINE SULFATE 10 MG: 50 INJECTION INTRAVENOUS at 14:51

## 2023-02-20 RX ADMIN — PROCHLORPERAZINE EDISYLATE 5 MG: 5 INJECTION INTRAMUSCULAR; INTRAVENOUS at 11:46

## 2023-02-20 RX ADMIN — PROPOFOL 300 MG: 10 INJECTION, EMULSION INTRAVENOUS at 12:20

## 2023-02-20 RX ADMIN — ROCURONIUM BROMIDE 10 MG: 10 INJECTION, SOLUTION INTRAVENOUS at 14:20

## 2023-02-20 RX ADMIN — Medication 3 G: at 12:25

## 2023-02-20 RX ADMIN — Medication 150 MG: at 12:20

## 2023-02-20 RX ADMIN — VASOPRESSIN 1 UNITS: 20 INJECTION INTRAVENOUS at 14:16

## 2023-02-20 RX ADMIN — DEXAMETHASONE SODIUM PHOSPHATE 8 MG: 4 INJECTION, SOLUTION INTRA-ARTICULAR; INTRALESIONAL; INTRAMUSCULAR; INTRAVENOUS; SOFT TISSUE at 12:20

## 2023-02-20 RX ADMIN — SCOPALAMINE 1 PATCH: 1 PATCH, EXTENDED RELEASE TRANSDERMAL at 09:30

## 2023-02-20 RX ADMIN — Medication 100 MCG: at 14:45

## 2023-02-20 RX ADMIN — GLYCOPYRROLATE 0.2 MG: 0.2 INJECTION INTRAMUSCULAR; INTRAVENOUS at 11:46

## 2023-02-20 RX ADMIN — DEXMEDETOMIDINE HYDROCHLORIDE 10 MCG: 100 INJECTION, SOLUTION, CONCENTRATE INTRAVENOUS at 14:44

## 2023-02-20 RX ADMIN — ROCURONIUM BROMIDE 20 MG: 10 INJECTION, SOLUTION INTRAVENOUS at 13:05

## 2023-02-20 RX ADMIN — VASOPRESSIN 1 UNITS: 20 INJECTION INTRAVENOUS at 13:30

## 2023-02-20 RX ADMIN — HYDROMORPHONE HYDROCHLORIDE 1 MG: 1 INJECTION, SOLUTION INTRAMUSCULAR; INTRAVENOUS; SUBCUTANEOUS at 13:09

## 2023-02-20 RX ADMIN — ROCURONIUM BROMIDE 50 MG: 10 INJECTION, SOLUTION INTRAVENOUS at 12:29

## 2023-02-20 RX ADMIN — Medication 100 MCG: at 14:28

## 2023-02-20 RX ADMIN — Medication 100 MCG: at 12:58

## 2023-02-20 RX ADMIN — Medication 100 MCG: at 14:51

## 2023-02-20 RX ADMIN — ROCURONIUM BROMIDE 10 MG: 10 INJECTION, SOLUTION INTRAVENOUS at 13:44

## 2023-02-20 RX ADMIN — CEFAZOLIN SODIUM 2 G: 2 INJECTION, SOLUTION INTRAVENOUS at 20:06

## 2023-02-20 RX ADMIN — Medication 100 MCG: at 13:12

## 2023-02-20 RX ADMIN — Medication 100 MCG: at 12:34

## 2023-02-20 RX ADMIN — SODIUM CHLORIDE, POTASSIUM CHLORIDE, SODIUM LACTATE AND CALCIUM CHLORIDE: 600; 310; 30; 20 INJECTION, SOLUTION INTRAVENOUS at 12:15

## 2023-02-20 RX ADMIN — EPHEDRINE SULFATE 10 MG: 50 INJECTION INTRAVENOUS at 13:20

## 2023-02-20 RX ADMIN — ACETAMINOPHEN 1000 MG: 500 TABLET ORAL at 09:30

## 2023-02-20 RX ADMIN — ROCURONIUM BROMIDE 10 MG: 10 INJECTION, SOLUTION INTRAVENOUS at 14:40

## 2023-02-20 RX ADMIN — SUGAMMADEX 200 MG: 100 INJECTION, SOLUTION INTRAVENOUS at 14:55

## 2023-02-20 NOTE — PLAN OF CARE
Goal Outcome Evaluation:   Patient admitted to unit following procedure. R neck incision with dressing; clean, dry and intact. Patient denies any numbness/tingling of extremities. Denies any pain at this time. No nausea/vomiting.

## 2023-02-20 NOTE — ANESTHESIA PREPROCEDURE EVALUATION
Anesthesia Evaluation     Patient summary reviewed and Nursing notes reviewed   history of anesthetic complications: PONV  NPO Solid Status: > 8 hours  NPO Liquid Status: > 2 hours           Airway   Mallampati: III  TM distance: >3 FB  Neck ROM: full  Possible difficult intubation, Large neck circumference and Small opening  Dental      Pulmonary - negative pulmonary ROS and normal exam    breath sounds clear to auscultation  Cardiovascular - negative cardio ROS and normal exam  Exercise tolerance: good (4-7 METS)    ECG reviewed  Rhythm: regular  Rate: normal        Neuro/Psych- negative ROS  GI/Hepatic/Renal/Endo - negative ROS     Musculoskeletal (-) negative ROS    Abdominal    Substance History - negative use     OB/GYN negative ob/gyn ROS         Other - negative ROS       ROS/Med Hx Other: PAT Nursing Notes unavailable.                   Anesthesia Plan    ASA 1     general     (Patient understands anesthesia not responsible for dental damage.    Have glidescope available for intubation)  intravenous induction     Anesthetic plan, risks, benefits, and alternatives have been provided, discussed and informed consent has been obtained with: patient.    Use of blood products discussed with patient .   Plan discussed with CRNA.        CODE STATUS:

## 2023-02-20 NOTE — OP NOTE
CERVICAL DISCECTOMY ANTERIOR WITH FUSION  Procedure Report    Patient Name:  Gerald Escalera  YOB: 1965    Date of Surgery:  2/20/2023     Indications: Cervical 5-cervical 6 and cervical 6-cervical 7 disc herniation with radiculopathy and spinal stenosis    Pre-op Diagnosis:   Herniated nucleus pulposus, C6-7 [M50.223]       Post-Op Diagnosis Codes:     * Herniated nucleus pulposus, C6-7 [M50.223]    Procedure/CPT® Codes:      Procedure(s):  ANTERIOR CERVICAL DISCECTOMY AND FUSION USING ALLOGRAFT BONE AND INSTRUMENTATION, right approach, cervical 5-cervical 6 and cervical 6-cervical 7    Staff:  Surgeon(s):  Layo Kirkpatrick MD    Assistant: Leila Maciel RN CSA    Anesthesia: General    Estimated Blood Loss: 25 mL      Specimen:          None        Findings: Calcified disc protrusion at C5-6 and C6-7    Complications: No apparent intraoperative complications    Description of Procedure:   After informed consent was obtained, the patient was brought to the operating room.  After the induction of adequate general endotracheal anesthesia, the patient was placed in the supine position.  A small bump was placed under the neck and the head was placed on a donut head pedraza.  The neck was prepped and draped in typical fashion.  A timeout was performed.  The surgical level was localized using the C arm.  A transverse skin crease was divided and taken down through the platysma.  The platysma was undermined superiorly and inferiorly.  An avascular plane was then created medial to the carotid artery.  Dissection was continued down to the anterior cervical spine.  The anterior cervical spine was cleared of soft tissue using a Kittner sponge.    The C5-6 disc space was localized using a spinal needle and the C arm.  The longus coli was then dissected from C5-C7 to allow for placement of the shadow line retractor.  A distraction pin was placed at C5 and again the level was confirmed with fluoroscopy.  A  distraction pin was separately placed at C6 and the disc base distracted across.  The microscope was brought in at this point and used for the remainder of the case for improved magnification and illumination.  The disc base was incised and the disc material removed using a Naveed pituitary.  The curette was used to remove the disc from the endplates down to the posterior osteophyte.  The osteophyte was undercut decompressing from uncovertebral to uncovertebral joint.  The posterior longitudinal ligament was then elevated and there was seen to be a calcified fragment of disc which was toward the left side of the spinal canal.  This was undercut using the Kerrison punch until removed.  The thecal sac appeared to be adequately decompressed.  The disc space defect was sized using the VG-2 sizers.  A 6 x 8 graft was seen to the appropriate fit.  This was obtained, rinsed and reconstituted using normal saline.  It was then tamped into the disc space defect after using the rasp to further prepare the endplates.  The distraction pin was removed from C5 and then replaced at C7.  The C6-7 disc base was then distracted across and the identical procedure was performed.  Again at this level there was seen to be a calcified disc compressing the spinal cord slightly more midline at this level.  The curette was needed to get the calcified fragment from under the C7 vertebral body.  After decompression hemostasis was obtained and the disc space defect was sized.  Again a 6 x 8 bone graft was seen to the appropriate fit.  After appropriate preparation this was tamped into the disc space defect and slightly countersunk.  The distraction pins at see 6 and C7 were removed.  A size 38 skyline plate from [employer] was then secured from C5-C7 using 14 mm bone screws.  After securing these firmly to the plate the Cam screws were tightened.     The Shadow-line retractor was removed and hemostasis was assured in the soft tissue.  The wound was  irrigated with normal saline.  The platysma was reapproximated using a running 2-0 Vicryl followed by a subcutaneous 4-0 Monocryl to reapproximate the skin edges.  The wound was dressed with Mastisol, Steri-Strips, Telfa and Tegaderm.  There were no apparent intraoperative complications.  All sponge and needle counts were correct.  The patient received a dose of preoperative antibiotics.    Spinal Surgery Levels Completed:2 Levels      Assistant: Leila Maciel RN CSA  was responsible for performing the following activities: Retraction, Suction, Irrigation and Placing Dressing and their skilled assistance was necessary for the success of this case.    Layo Kirkpatrick MD     Date: 2/20/2023  Time: 15:20 EST

## 2023-02-20 NOTE — ANESTHESIA POSTPROCEDURE EVALUATION
Patient: Gerald Escalera    Procedure Summary     Date: 02/20/23 Room / Location: [facility] OR 05 / [facility] MAIN OR    Anesthesia Start: 1215 Anesthesia Stop: 1514    Procedure: ANTERIOR CERVICAL DISCECTOMY AND FUSION USING ALLOGRAFT BONE AND INSTRUMENTATION, right approach, cervical 5-cervical 6 and cervical 6-cervical 7 (Right: Spine Cervical) Diagnosis:       Herniated nucleus pulposus, C6-7      (Herniated nucleus pulposus, C6-7 [M50.223])    Surgeons: Layo Kirkpatrick MD Provider: Beau Enriquez MD    Anesthesia Type: general ASA Status: 1          Anesthesia Type: general    Vitals  Vitals Value Taken Time   /65 02/20/23 1612   Temp 36.4 °C (97.6 °F) 02/20/23 1540   Pulse 96 02/20/23 1617   Resp 16 02/20/23 1605   SpO2 95 % 02/20/23 1617   Vitals shown include unvalidated device data.        Post Anesthesia Care and Evaluation    Patient location during evaluation: bedside  Patient participation: complete - patient participated  Level of consciousness: awake  Pain management: adequate    Airway patency: patent  Anesthetic complications: No anesthetic complications  PONV Status: none  Cardiovascular status: acceptable and stable  Respiratory status: acceptable  Hydration status: acceptable    Comments: An Anesthesiologist personally participated in the most demanding procedures (including induction and emergence if applicable) in the anesthesia plan, monitored the course of anesthesia administration at frequent intervals and remained physically present and available for immediate diagnosis and treatment of emergencies.

## 2023-02-21 VITALS
SYSTOLIC BLOOD PRESSURE: 129 MMHG | DIASTOLIC BLOOD PRESSURE: 71 MMHG | HEIGHT: 71 IN | RESPIRATION RATE: 16 BRPM | OXYGEN SATURATION: 95 % | TEMPERATURE: 97.3 F | WEIGHT: 310.41 LBS | BODY MASS INDEX: 43.46 KG/M2 | HEART RATE: 87 BPM

## 2023-02-21 PROBLEM — M50.223 HERNIATED NUCLEUS PULPOSUS, C6-7: Status: RESOLVED | Noted: 2023-02-20 | Resolved: 2023-02-21

## 2023-02-21 LAB — QT INTERVAL: 382 MS

## 2023-02-21 PROCEDURE — 99024 POSTOP FOLLOW-UP VISIT: CPT | Performed by: NEUROLOGICAL SURGERY

## 2023-02-21 PROCEDURE — 25010000002 CEFAZOLIN IN DEXTROSE 2-4 GM/100ML-% SOLUTION: Performed by: NEUROLOGICAL SURGERY

## 2023-02-21 RX ORDER — OXYCODONE HYDROCHLORIDE AND ACETAMINOPHEN 5; 325 MG/1; MG/1
1 TABLET ORAL EVERY 4 HOURS PRN
Qty: 25 TABLET | Refills: 0 | Status: SHIPPED | OUTPATIENT
Start: 2023-02-21 | End: 2023-03-14

## 2023-02-21 RX ORDER — FENTANYL/ROPIVACAINE/NS/PF 2-625MCG/1
15 PLASTIC BAG, INJECTION (ML) EPIDURAL
Status: COMPLETED | OUTPATIENT
Start: 2023-02-21 | End: 2023-02-21

## 2023-02-21 RX ADMIN — POTASSIUM PHOSPHATE, MONOBASIC AND POTASSIUM PHOSPHATE, DIBASIC 15 MMOL: 224; 236 INJECTION, SOLUTION, CONCENTRATE INTRAVENOUS at 11:23

## 2023-02-21 RX ADMIN — POTASSIUM PHOSPHATE, MONOBASIC AND POTASSIUM PHOSPHATE, DIBASIC 15 MMOL: 224; 236 INJECTION, SOLUTION, CONCENTRATE INTRAVENOUS at 08:24

## 2023-02-21 RX ADMIN — OXYCODONE AND ACETAMINOPHEN 1 TABLET: 5; 325 TABLET ORAL at 10:49

## 2023-02-21 RX ADMIN — CEFAZOLIN SODIUM 2 G: 2 INJECTION, SOLUTION INTRAVENOUS at 04:52

## 2023-02-21 RX ADMIN — Medication 1 LOZENGE: at 11:24

## 2023-02-21 NOTE — PLAN OF CARE
Goal Outcome Evaluation:   Minimal pain reported. Pt walked around unit without issue. To be discharged home this day.

## 2023-02-21 NOTE — PROGRESS NOTES
[facility]   Neurosurgery Progress Note    Patient Name: Gerald Escalera  : 1965  MRN: [id number]  Date of admission: 2023  Surgical Procedures Since Admission:  Procedure(s):  ANTERIOR CERVICAL DISCECTOMY AND FUSION USING ALLOGRAFT BONE AND INSTRUMENTATION, right approach, cervical 5-cervical 6 and cervical 6-cervical 7  Surgeon:  Layo Kirkpatrick MD  Status:  1 Day Post-Op  -------------------    Subjective   Subjective     Chief Complaint: Postop day 1 from ACDF.  Throat pain as expected.    History of Present Illness   He has expected throat pain.  He is able to eat some yesterday without significant issue.  He has ambulated in his room but not yet in the hallway.  He is not requiring IV pain medication.  His numbness has improved.      Objective   Objective     Vitals:   Temp:  [96.9 °F (36.1 °C)-98.9 °F (37.2 °C)] 97.9 °F (36.6 °C)  Heart Rate:  [] 93  Resp:  [14-20] 18  BP: (109-143)/(56-82) 143/75  Flow (L/min):  [2-6] 2    His dressing is clean/dry/intact.  There is no evidence of swelling.  His voice is strong.  Strength appears good in the upper extremities.      Assessment & Plan   Assessment / Plan     Brief Patient Summary:  Gerald Escalera is a 57 y.o. male who is postop day 1 from two-level ACDF.    Active Hospital Problems:  Active Hospital Problems    Diagnosis    • Herniated nucleus pulposus, C6-7      Plan:   If able to ambulate in the hallway we will plan to discharge today.  He feels he will likely be able to do this.

## 2023-02-21 NOTE — PLAN OF CARE
Goal Outcome Evaluation:  Plan of Care Reviewed With: patient           Outcome Evaluation: medicated x1 for pain this shift, VSS. patient ambulated to bathroom several times this shift. no numbness or tingling reported.

## 2023-03-07 DIAGNOSIS — Z76.89 ENCOUNTER FOR WEIGHT MANAGEMENT: Primary | ICD-10-CM

## 2023-03-07 RX ORDER — SEMAGLUTIDE 0.25 MG/.5ML
0.25 INJECTION, SOLUTION SUBCUTANEOUS WEEKLY
Qty: 1 ML | Refills: 0 | Status: SHIPPED | OUTPATIENT
Start: 2023-03-07 | End: 2023-03-17

## 2023-03-14 ENCOUNTER — OFFICE VISIT (OUTPATIENT)
Dept: NEUROSURGERY | Facility: CLINIC | Age: 58
End: 2023-03-14
Payer: COMMERCIAL

## 2023-03-14 VITALS
DIASTOLIC BLOOD PRESSURE: 75 MMHG | SYSTOLIC BLOOD PRESSURE: 132 MMHG | HEART RATE: 76 BPM | BODY MASS INDEX: 43.96 KG/M2 | HEIGHT: 71 IN | WEIGHT: 314 LBS

## 2023-03-14 DIAGNOSIS — Z98.1 STATUS POST CERVICAL SPINAL FUSION: ICD-10-CM

## 2023-03-14 DIAGNOSIS — M50.223 HERNIATED NUCLEUS PULPOSUS, C6-7: Primary | ICD-10-CM

## 2023-03-14 PROCEDURE — 99024 POSTOP FOLLOW-UP VISIT: CPT | Performed by: PHYSICIAN ASSISTANT

## 2023-03-17 ENCOUNTER — OFFICE VISIT (OUTPATIENT)
Dept: FAMILY MEDICINE CLINIC | Facility: CLINIC | Age: 58
End: 2023-03-17
Payer: COMMERCIAL

## 2023-03-17 VITALS
WEIGHT: 311.4 LBS | BODY MASS INDEX: 61.14 KG/M2 | DIASTOLIC BLOOD PRESSURE: 99 MMHG | HEIGHT: 60 IN | HEART RATE: 84 BPM | OXYGEN SATURATION: 94 % | SYSTOLIC BLOOD PRESSURE: 144 MMHG

## 2023-03-17 DIAGNOSIS — M54.50 CHRONIC MIDLINE LOW BACK PAIN, UNSPECIFIED WHETHER SCIATICA PRESENT: ICD-10-CM

## 2023-03-17 DIAGNOSIS — G89.29 CHRONIC MIDLINE LOW BACK PAIN, UNSPECIFIED WHETHER SCIATICA PRESENT: ICD-10-CM

## 2023-03-17 DIAGNOSIS — I10 BENIGN ESSENTIAL HTN: Primary | ICD-10-CM

## 2023-03-17 DIAGNOSIS — Z13.6 ENCOUNTER FOR LIPID SCREENING FOR CARDIOVASCULAR DISEASE: ICD-10-CM

## 2023-03-17 DIAGNOSIS — Z13.220 ENCOUNTER FOR LIPID SCREENING FOR CARDIOVASCULAR DISEASE: ICD-10-CM

## 2023-03-17 DIAGNOSIS — Z13.29 SCREENING FOR THYROID DISORDER: ICD-10-CM

## 2023-03-17 PROCEDURE — 99213 OFFICE O/P EST LOW 20 MIN: CPT

## 2023-03-17 RX ORDER — SEMAGLUTIDE 0.5 MG/.5ML
0.5 INJECTION, SOLUTION SUBCUTANEOUS WEEKLY
Qty: 2 ML | Refills: 2 | Status: SHIPPED | OUTPATIENT
Start: 2023-04-08

## 2023-03-17 NOTE — PROGRESS NOTES
Chief Complaint  Back Pain (LBP better since SX )    SUBJECTIVE  Gerald Escalera presents to [facility]    History of Present Illness  57-year-old Gerald Escalera presents today for a 3-month follow-up.    Patient states that the back pain has improved since surgery.  He is still seeing Dr. Layo Kirkpatrick.  Patient states that he has restrictions until mid May.  He does still have an order in for pain management as needed.  States that he is currently taking the Tylenol as needed for any pain.  Still complains of numbness of left rib area.    Patient states that he finally received his prescription of Wegovy.  States he started taking on 3/11/2023.  We discussed increase dosage to start April 8.    Discussed with patient that his blood pressure on today's visit was 144/99.  Patient states that he is due to  a trailer today and is concerned that it may not be finished.  States that he is usually lower at home.    Discussed with patient that his triglyceride and cholesterol were elevated.  He states that he was not fasting at that time.  We discussed that we would repeat labs in 6 months prior to our follow-up appointment fasting.        Past Medical History:   Diagnosis Date   • Cervical disc disorder    • Complete tear of left rotator cuff    • Herniated nucleus pulposus, C6-7    • Lumbosacral disc disease    • Rotator cuff syndrome    • Thoracic disc disorder       Family History   Problem Relation Age of Onset   • Malig Hyperthermia Neg Hx       Past Surgical History:   Procedure Laterality Date   • ANTERIOR CERVICAL DISCECTOMY W/ FUSION Right 2/20/2023    Procedure: ANTERIOR CERVICAL DISCECTOMY AND FUSION USING ALLOGRAFT BONE AND INSTRUMENTATION, right approach, cervical 5-cervical 6 and cervical 6-cervical 7;  Surgeon: Layo Kirkpatrick MD;  Location: [facility] MAIN OR;  Service: Neurosurgery;  Laterality: Right;   • CHOLECYSTECTOMY     • ROTATOR CUFF REPAIR Right    • SHOULDER  "ARTHROSCOPY W/ ROTATOR CUFF REPAIR Left 11/15/2021    Procedure: LEFT SHOULDER ARTHROSCOPyY , MINI ROTATOR CUFF REPAIR,SUBACROMINAL DECOMPRESSION,  BICEPS TENDODESIS;  Surgeon: Jorgito Marquez MD;  [place] [facility] [place] [facility]  Service: Orthopedics;  Laterality: Left;        Current Outpatient Medications:   •  [START ON 4/8/2023] Semaglutide-Weight Management (Wegovy) 0.5 MG/0.5ML solution auto-injector, Inject 0.5 mL under the skin into the appropriate area as directed 1 (One) Time Per Week., Disp: 2 mL, Rfl: 2    OBJECTIVE  Vital Signs:   /99 (BP Location: Right arm)   Pulse 84   Ht 71 cm (27.95\")   Wt (!) 141 kg (311 lb 6.4 oz)   SpO2 94%   .20 kg/m²    Estimated body mass index is 280.2 kg/m² as calculated from the following:    Height as of this encounter: 71 cm (27.95\").    Weight as of this encounter: 141 kg (311 lb 6.4 oz).     Wt Readings from Last 3 Encounters:   03/17/23 (!) 141 kg (311 lb 6.4 oz)   03/14/23 (!) 142 kg (314 lb)   02/20/23 (!) 141 kg (310 lb 6.5 oz)     BP Readings from Last 3 Encounters:   03/17/23 144/99   03/14/23 132/75   02/21/23 129/71       Physical Exam  Vitals and nursing note reviewed.   Constitutional:       Appearance: Normal appearance. He is obese.   HENT:      Head: Normocephalic and atraumatic.      Nose: Nose normal.      Mouth/Throat:      Mouth: Mucous membranes are moist.   Eyes:      Conjunctiva/sclera: Conjunctivae normal.      Pupils: Pupils are equal, round, and reactive to light.   Cardiovascular:      Rate and Rhythm: Normal rate and regular rhythm.      Pulses: Normal pulses.      Heart sounds: Normal heart sounds.   Pulmonary:      Effort: Pulmonary effort is normal.      Breath sounds: Normal breath sounds.   Abdominal:      General: Abdomen is flat.      Palpations: Abdomen is soft.   Musculoskeletal:         General: Normal range of motion.      Cervical back: Normal range of motion and neck supple.   Skin:     General: Skin is warm and " dry.   Neurological:      General: No focal deficit present.      Mental Status: He is alert and oriented to person, place, and time. Mental status is at baseline.   Psychiatric:         Mood and Affect: Mood normal.         Behavior: Behavior normal.         Thought Content: Thought content normal.         Judgment: Judgment normal.          Result Review        XR Knee 3 View Right    Result Date: 12/16/2022   Mild tricompartment degenerative change in the right knee.  Small right suprapatellar effusion.      FARHAN NGUYỄN MD       Electronically Signed and Approved By: FARHAN NGUYỄN MD on 12/16/2022 at 16:30             MRI Cervical Spine Without Contrast    Result Date: 1/11/2023    1. There is a new large central disc extrusion at the C6-C7 level resulting in severe spinal canal narrowing and new cord compression with minimal cord signal abnormality. 2. There are otherwise stable degenerative changes at multiple levels in the cervical spine resulting in varying degrees of neural foraminal and spinal canal narrowing discussed level by level above.     INES JOHNSON MD       Electronically Signed and Approved By: INES JOHNSON MD on 1/11/2023 at 16:15             MRI Thoracic Spine Without Contrast    Result Date: 1/17/2023    1. Multilevel degenerative disc disease and degenerative facet change throughout the thoracic spine resulting in multilevel canal stenosis and neural foraminal narrowing as detailed above. 2. Large central disc protrusion at the C6/7 level flattening the cervical spinal cord and resulting in severe spinal canal stenosis.      MARKOS TREVIÑO MD       Electronically Signed and Approved By: MARKOS TREVIÑO MD on 1/17/2023 at 10:55             MRI Lumbar Spine With & Without Contrast    Result Date: 1/11/2023    1. Multilevel degenerative disc disease and degenerative facet change combining with a component of congenital canal stenosis to cause multilevel spinal canal stenosis.  This is  most pronounced at the L1/2 level is detailed above.      MARKOS TREVIÑO MD       Electronically Signed and Approved By: MARKOS TREVIÑO MD on 1/11/2023 at 16:47                 The above data has been reviewed by ISMAEL Merino 03/17/2023 10:54 EDT.          Patient Care Team:  Yen Santo APRN as PCP - General (Emergency Medicine)           ASSESSMENT & PLAN    Diagnoses and all orders for this visit:    1. Benign essential HTN (Primary)  Comments:  Patient will continue to monitor this at home, we discussed that if it does stay elevated we may need to do a follow-up appointment.  Orders:  -     Comprehensive metabolic panel; Future  -     CBC w AUTO Differential; Future    2. Screening for thyroid disorder  -     TSH; Future    3. Encounter for lipid screening for cardiovascular disease  Comments:  Discussed with patient that he needs to do these labs fasting and we will repeat them in 6 months.  Orders:  -     Lipid panel; Future    4. Chronic midline low back pain, unspecified whether sciatica present  Comments:  Patient states that pain is improving since surgery.  We will continue to monitor this.  He is also following with neurosurgery.    Other orders  -     Semaglutide-Weight Management (Wegovy) 0.5 MG/0.5ML solution auto-injector; Inject 0.5 mL under the skin into the appropriate area as directed 1 (One) Time Per Week.  Dispense: 2 mL; Refill: 2         Tobacco Use: Low Risk    • Smoking Tobacco Use: Never   • Smokeless Tobacco Use: Never   • Passive Exposure: Not on file       Follow Up     No follow-ups on file.      Patient was given instructions and counseling regarding his condition or for health maintenance advice. Please see specific information pulled into the AVS if appropriate.   I have reviewed information obtained and documented by others and I have confirmed the accuracy of this documented note.    ISMAEL Merino

## 2023-04-30 ENCOUNTER — PATIENT MESSAGE (OUTPATIENT)
Dept: FAMILY MEDICINE CLINIC | Facility: CLINIC | Age: 58
End: 2023-04-30

## 2023-05-01 RX ORDER — SEMAGLUTIDE 0.5 MG/.5ML
0.5 INJECTION, SOLUTION SUBCUTANEOUS WEEKLY
Qty: 2 ML | Refills: 2 | Status: SHIPPED | OUTPATIENT
Start: 2023-05-01

## 2023-05-12 ENCOUNTER — HOSPITAL ENCOUNTER (OUTPATIENT)
Dept: GENERAL RADIOLOGY | Facility: HOSPITAL | Age: 58
Discharge: HOME | End: 2023-05-12
Payer: COMMERCIAL

## 2023-05-12 DIAGNOSIS — M50.223 HERNIATED NUCLEUS PULPOSUS, C6-7: ICD-10-CM

## 2023-05-12 DIAGNOSIS — Z98.1 STATUS POST CERVICAL SPINAL FUSION: ICD-10-CM

## 2023-05-12 PROCEDURE — 72050 X-RAY EXAM NECK SPINE 4/5VWS: CPT

## 2023-05-16 ENCOUNTER — OFFICE VISIT (OUTPATIENT)
Dept: NEUROSURGERY | Facility: CLINIC | Age: 58
End: 2023-05-16
Payer: COMMERCIAL

## 2023-05-16 VITALS
WEIGHT: 311 LBS | HEART RATE: 79 BPM | HEIGHT: 60 IN | DIASTOLIC BLOOD PRESSURE: 81 MMHG | BODY MASS INDEX: 61.06 KG/M2 | SYSTOLIC BLOOD PRESSURE: 126 MMHG

## 2023-05-16 DIAGNOSIS — M50.223 HERNIATED NUCLEUS PULPOSUS, C6-7: Primary | ICD-10-CM

## 2023-05-16 DIAGNOSIS — Z98.1 STATUS POST CERVICAL SPINAL FUSION: ICD-10-CM

## 2023-05-16 NOTE — PROGRESS NOTES
Patient being seen for today for Follow-up  .    Subjective    Gerald Escalera is a 57 y.o. male that presents with Follow-up  .    HPI  Previously: Last seen on 3/14/2023 for postoperative follow-up from ACDF using right approach at C5-C6 and C6-C7 on 2/20/2023.  He was doing well at that time.  There was plan to continue physical restrictions for at least 9 weeks.  There was an order for x-ray of the cervical spine to monitor the ACDF at C5-C6 and C6-C7.    Today: He denies any pain in the neck or arms today. He denies any new complaints.    He has been following his physical restrictions as best as he can.     reports that he has never smoked. He has never used smokeless tobacco.    Review of Systems   Musculoskeletal: Negative for neck pain.       Objective   Vitals:    05/16/23 1422   BP: 126/81   Pulse: 79        Physical Exam  Constitutional:       Appearance: Normal appearance.   Pulmonary:      Effort: Pulmonary effort is normal.   Musculoskeletal:         General: No tenderness.      Comments: Jarrett's negative bilaterally   Skin:     Comments: Anterior cervical incision is well healed   Neurological:      General: No focal deficit present.      Mental Status: He is alert and oriented to person, place, and time.      Sensory: No sensory deficit.      Motor: No weakness.      Deep Tendon Reflexes: Reflexes normal.   Psychiatric:         Mood and Affect: Mood normal.         Behavior: Behavior normal.          Result Review   I have personally reviewed the x-ray of cervical spine from 5/12/2023 which shows stable ACDF at C5-C7.     Assessment and Plan {CC Problem List  Visit Diagnosis  ROS  Review (Popup)  Health Maintenance  Quality  BestPractice  Medications  SmartSets  SnapShot Encounters  Media :23}   Diagnoses and all orders for this visit:    1. Herniated nucleus pulposus, C6-7 (Primary)  -     XR Spine Cervical Complete 4 or 5 View; Future    2. Status post cervical spinal fusion  -     XR  Spine Cervical Complete 4 or 5 View; Future    He is going to resume normal activity as tolerated.    He will monitor his symptoms for worsening or new complaints and notify us of change.    He will complete an x-ray of the cervical spine to monitor the ACDF at C5-C7. He will complete this just prior to next follow-up.    He will follow-up in 3 months, or sooner if needed.  Follow Up {Instructions Charge Capture  Follow-up Communications :23}   Return in about 3 months (around 8/16/2023).

## 2023-05-29 ENCOUNTER — PATIENT MESSAGE (OUTPATIENT)
Dept: FAMILY MEDICINE CLINIC | Facility: CLINIC | Age: 58
End: 2023-05-29

## 2023-05-30 DIAGNOSIS — Z76.89 ENCOUNTER FOR WEIGHT MANAGEMENT: ICD-10-CM

## 2023-05-30 RX ORDER — SEMAGLUTIDE 1.7 MG/.75ML
1.7 INJECTION, SOLUTION SUBCUTANEOUS WEEKLY
Qty: 3 ML | Refills: 1 | Status: SHIPPED | OUTPATIENT
Start: 2023-05-30 | End: 2023-06-29

## 2023-05-30 NOTE — TELEPHONE ENCOUNTER
From: Gerald Escalera  To: Yen Santo  Sent: 5/29/2023 5:53 PM EDT  Subject: Wegovy    Good afternoon. I too my last 1.0 this weekend and need a new script for the 1.7 level.     Thank you.

## 2023-05-31 RX ORDER — SEMAGLUTIDE 0.25 MG/.5ML
INJECTION, SOLUTION SUBCUTANEOUS
Qty: 2 ML | OUTPATIENT
Start: 2023-05-31

## 2023-07-24 RX ORDER — SEMAGLUTIDE 2.4 MG/.75ML
2.4 INJECTION, SOLUTION SUBCUTANEOUS WEEKLY
Qty: 3 ML | Refills: 3 | OUTPATIENT
Start: 2023-07-24 | End: 2023-08-23

## 2023-07-27 RX ORDER — SEMAGLUTIDE 2.4 MG/.75ML
2.4 INJECTION, SOLUTION SUBCUTANEOUS WEEKLY
Qty: 3 ML | Refills: 3 | Status: SHIPPED | OUTPATIENT
Start: 2023-07-27 | End: 2023-08-26

## 2023-08-14 ENCOUNTER — HOSPITAL ENCOUNTER (OUTPATIENT)
Dept: GENERAL RADIOLOGY | Facility: HOSPITAL | Age: 58
Discharge: HOME | End: 2023-08-14
Admitting: PHYSICIAN ASSISTANT
Payer: COMMERCIAL

## 2023-08-14 DIAGNOSIS — M50.223 HERNIATED NUCLEUS PULPOSUS, C6-7: ICD-10-CM

## 2023-08-14 DIAGNOSIS — Z98.1 STATUS POST CERVICAL SPINAL FUSION: ICD-10-CM

## 2023-08-14 PROCEDURE — 72050 X-RAY EXAM NECK SPINE 4/5VWS: CPT

## 2023-08-16 ENCOUNTER — OFFICE VISIT (OUTPATIENT)
Dept: NEUROSURGERY | Facility: CLINIC | Age: 58
End: 2023-08-16
Payer: COMMERCIAL

## 2023-08-16 VITALS
SYSTOLIC BLOOD PRESSURE: 121 MMHG | HEART RATE: 102 BPM | DIASTOLIC BLOOD PRESSURE: 80 MMHG | HEIGHT: 71 IN | WEIGHT: 302 LBS | BODY MASS INDEX: 42.28 KG/M2

## 2023-08-16 DIAGNOSIS — Z98.1 STATUS POST CERVICAL SPINAL FUSION: ICD-10-CM

## 2023-08-16 DIAGNOSIS — M50.223 HERNIATED NUCLEUS PULPOSUS, C6-7: Primary | ICD-10-CM

## 2023-08-16 NOTE — PROGRESS NOTES
Patient being seen for today for Follow-up  .    Subjective    Gerald Escalera is a 57 y.o. male that presents with Follow-up  .    HPI  Previously: Last seen on 5/16/2023 for a 12-week postoperative follow-up from ACDF at C5 C7 on 2/20/2023.  There was plan to resume normal activity as tolerated.  There was plan to follow-up in 3 months with x-ray to monitor ACDF at C5-C7.    Today: He denies new or changed complaints.    He denies pain today.    He has started going back to the gym and tolerates this well and finds it helpful overall.     reports that he has never smoked. He has never used smokeless tobacco.    Review of Systems   Musculoskeletal:  Negative for neck pain.     Objective   Vitals:    08/16/23 1331   BP: 121/80   Pulse: 102        Physical Exam  Constitutional:       Appearance: Normal appearance. He is obese.   Pulmonary:      Effort: Pulmonary effort is normal.   Musculoskeletal:         General: No tenderness.      Cervical back: Normal range of motion.      Comments: Jarrett's negative bilaterally   Neurological:      General: No focal deficit present.      Mental Status: He is alert and oriented to person, place, and time.      Sensory: No sensory deficit.      Motor: No weakness.      Deep Tendon Reflexes: Reflexes normal.   Psychiatric:         Mood and Affect: Mood normal.         Behavior: Behavior normal.        Result Review   I have personally reviewed the x-ray of cervical spine from 8/14/2023 which shows stable ACDF at C5-C7.     Assessment and Plan {CC Problem List  Visit Diagnosis  ROS  Review (Popup)  Health Maintenance  Quality  BestPractice  Medications  SmartSets  SnapShot Encounters  Media :23}   Diagnoses and all orders for this visit:    1. Herniated nucleus pulposus, C6-7 (Primary)  -     XR Spine Cervical Complete 4 or 5 View; Future    2. Status post cervical spinal fusion  -     XR Spine Cervical Complete 4 or 5 View; Future    He is doing well in the neck.    He  will monitor for worsening symptoms or new complaints and notify us of change.    He will follow-up here in 3 months to reassess with x-ray to monitor the ACDF at C5-C7, sooner if needed.  Follow Up {Instructions Charge Capture  Follow-up Communications :23}   Return in about 3 months (around 11/16/2023).

## 2023-08-21 RX ORDER — MELOXICAM 15 MG/1
TABLET ORAL
Qty: 90 TABLET | OUTPATIENT
Start: 2023-08-21

## 2023-09-18 ENCOUNTER — LAB (OUTPATIENT)
Dept: LAB | Facility: HOSPITAL | Age: 58
End: 2023-09-18
Payer: COMMERCIAL

## 2023-09-18 DIAGNOSIS — R73.09 ELEVATED GLUCOSE: ICD-10-CM

## 2023-09-18 DIAGNOSIS — E78.5 HYPERLIPIDEMIA, UNSPECIFIED HYPERLIPIDEMIA TYPE: ICD-10-CM

## 2023-09-18 DIAGNOSIS — Z13.6 ENCOUNTER FOR LIPID SCREENING FOR CARDIOVASCULAR DISEASE: ICD-10-CM

## 2023-09-18 DIAGNOSIS — Z13.29 SCREENING FOR THYROID DISORDER: ICD-10-CM

## 2023-09-18 DIAGNOSIS — I10 BENIGN ESSENTIAL HTN: ICD-10-CM

## 2023-09-18 DIAGNOSIS — Z13.220 ENCOUNTER FOR LIPID SCREENING FOR CARDIOVASCULAR DISEASE: ICD-10-CM

## 2023-09-18 LAB
ALBUMIN SERPL-MCNC: 4.6 G/DL (ref 3.5–5.2)
ALBUMIN/GLOB SERPL: 1.8 G/DL
ALP SERPL-CCNC: 62 U/L (ref 39–117)
ALT SERPL W P-5'-P-CCNC: 22 U/L (ref 1–41)
ANION GAP SERPL CALCULATED.3IONS-SCNC: 12.7 MMOL/L (ref 5–15)
AST SERPL-CCNC: 28 U/L (ref 1–40)
BASOPHILS # BLD AUTO: 0.08 10*3/MM3 (ref 0–0.2)
BASOPHILS NFR BLD AUTO: 1.5 % (ref 0–1.5)
BILIRUB SERPL-MCNC: 0.7 MG/DL (ref 0–1.2)
BUN SERPL-MCNC: 14 MG/DL (ref 6–20)
BUN/CREAT SERPL: 10.9 (ref 7–25)
CALCIUM SPEC-SCNC: 9.6 MG/DL (ref 8.6–10.5)
CHLORIDE SERPL-SCNC: 104 MMOL/L (ref 98–107)
CHOLEST SERPL-MCNC: 211 MG/DL (ref 0–200)
CO2 SERPL-SCNC: 25.3 MMOL/L (ref 22–29)
CREAT SERPL-MCNC: 1.28 MG/DL (ref 0.76–1.27)
DEPRECATED RDW RBC AUTO: 38.5 FL (ref 37–54)
EGFRCR SERPLBLD CKD-EPI 2021: 64.9 ML/MIN/1.73
EOSINOPHIL # BLD AUTO: 0.17 10*3/MM3 (ref 0–0.4)
EOSINOPHIL NFR BLD AUTO: 3.2 % (ref 0.3–6.2)
ERYTHROCYTE [DISTWIDTH] IN BLOOD BY AUTOMATED COUNT: 12.1 % (ref 12.3–15.4)
GLOBULIN UR ELPH-MCNC: 2.5 GM/DL
GLUCOSE SERPL-MCNC: 106 MG/DL (ref 65–99)
HBA1C MFR BLD: 5.8 % (ref 4.8–5.6)
HCT VFR BLD AUTO: 43.4 % (ref 37.5–51)
HDLC SERPL-MCNC: 38 MG/DL (ref 40–60)
HGB BLD-MCNC: 15.2 G/DL (ref 13–17.7)
IMM GRANULOCYTES # BLD AUTO: 0.02 10*3/MM3 (ref 0–0.05)
IMM GRANULOCYTES NFR BLD AUTO: 0.4 % (ref 0–0.5)
LDLC SERPL CALC-MCNC: 145 MG/DL (ref 0–100)
LDLC/HDLC SERPL: 3.75 {RATIO}
LYMPHOCYTES # BLD AUTO: 1.58 10*3/MM3 (ref 0.7–3.1)
LYMPHOCYTES NFR BLD AUTO: 29.3 % (ref 19.6–45.3)
MCH RBC QN AUTO: 30.7 PG (ref 26.6–33)
MCHC RBC AUTO-ENTMCNC: 35 G/DL (ref 31.5–35.7)
MCV RBC AUTO: 87.7 FL (ref 79–97)
MONOCYTES # BLD AUTO: 0.43 10*3/MM3 (ref 0.1–0.9)
MONOCYTES NFR BLD AUTO: 8 % (ref 5–12)
NEUTROPHILS NFR BLD AUTO: 3.11 10*3/MM3 (ref 1.7–7)
NEUTROPHILS NFR BLD AUTO: 57.6 % (ref 42.7–76)
NRBC BLD AUTO-RTO: 0 /100 WBC (ref 0–0.2)
PLATELET # BLD AUTO: 308 10*3/MM3 (ref 140–450)
PMV BLD AUTO: 9.5 FL (ref 6–12)
POTASSIUM SERPL-SCNC: 4.3 MMOL/L (ref 3.5–5.2)
PROT SERPL-MCNC: 7.1 G/DL (ref 6–8.5)
RBC # BLD AUTO: 4.95 10*6/MM3 (ref 4.14–5.8)
SODIUM SERPL-SCNC: 142 MMOL/L (ref 136–145)
TRIGL SERPL-MCNC: 153 MG/DL (ref 0–150)
TSH SERPL DL<=0.05 MIU/L-ACNC: 2.58 UIU/ML (ref 0.27–4.2)
VLDLC SERPL-MCNC: 28 MG/DL (ref 5–40)
WBC NRBC COR # BLD: 5.39 10*3/MM3 (ref 3.4–10.8)

## 2023-09-18 PROCEDURE — 80061 LIPID PANEL: CPT

## 2023-09-18 PROCEDURE — 80050 GENERAL HEALTH PANEL: CPT

## 2023-09-18 PROCEDURE — 83036 HEMOGLOBIN GLYCOSYLATED A1C: CPT

## 2023-09-18 PROCEDURE — 36415 COLL VENOUS BLD VENIPUNCTURE: CPT

## 2023-09-19 ENCOUNTER — OFFICE VISIT (OUTPATIENT)
Dept: FAMILY MEDICINE CLINIC | Facility: CLINIC | Age: 58
End: 2023-09-19
Payer: COMMERCIAL

## 2023-09-19 VITALS
DIASTOLIC BLOOD PRESSURE: 74 MMHG | SYSTOLIC BLOOD PRESSURE: 131 MMHG | BODY MASS INDEX: 41.58 KG/M2 | OXYGEN SATURATION: 97 % | WEIGHT: 297 LBS | HEART RATE: 77 BPM | HEIGHT: 71 IN

## 2023-09-19 DIAGNOSIS — Z23 NEED FOR INFLUENZA VACCINATION: ICD-10-CM

## 2023-09-19 DIAGNOSIS — E66.01 CLASS 3 SEVERE OBESITY DUE TO EXCESS CALORIES WITHOUT SERIOUS COMORBIDITY WITH BODY MASS INDEX (BMI) OF 40.0 TO 44.9 IN ADULT: ICD-10-CM

## 2023-09-19 DIAGNOSIS — M54.50 CHRONIC MIDLINE LOW BACK PAIN, UNSPECIFIED WHETHER SCIATICA PRESENT: Primary | ICD-10-CM

## 2023-09-19 DIAGNOSIS — G89.29 CHRONIC MIDLINE LOW BACK PAIN, UNSPECIFIED WHETHER SCIATICA PRESENT: Primary | ICD-10-CM

## 2023-09-19 PROBLEM — M54.12 CERVICAL RADICULOPATHY: Status: ACTIVE | Noted: 2020-09-10

## 2023-09-19 PROBLEM — M75.121 COMPLETE TEAR OF RIGHT ROTATOR CUFF: Status: ACTIVE | Noted: 2018-11-20

## 2023-09-19 PROBLEM — M75.100 ROTATOR CUFF SYNDROME: Status: ACTIVE | Noted: 2018-11-07

## 2023-09-19 PROBLEM — M75.40 SUBACROMIAL IMPINGEMENT: Status: ACTIVE | Noted: 2018-11-07

## 2023-09-19 PROBLEM — M25.511 RIGHT SHOULDER PAIN: Status: ACTIVE | Noted: 2023-09-19

## 2023-09-19 PROBLEM — M25.9 DISORDER OF SHOULDER: Status: ACTIVE | Noted: 2018-11-07

## 2023-09-19 PROBLEM — S46.009A ROTATOR CUFF INJURY: Status: ACTIVE | Noted: 2020-09-10

## 2023-09-19 PROBLEM — S43.431A SUPERIOR GLENOID LABRUM LESION OF RIGHT SHOULDER: Status: ACTIVE | Noted: 2018-11-07

## 2023-09-19 RX ORDER — MELOXICAM 7.5 MG/1
7.5 TABLET ORAL DAILY
Qty: 90 TABLET | Refills: 1 | Status: SHIPPED | OUTPATIENT
Start: 2023-09-19 | End: 2024-03-17

## 2023-09-19 RX ORDER — SEMAGLUTIDE 2.4 MG/.75ML
2.4 INJECTION, SOLUTION SUBCUTANEOUS WEEKLY
Qty: 3 ML | Refills: 3 | Status: SHIPPED | OUTPATIENT
Start: 2023-09-19 | End: 2023-10-19

## 2023-09-19 NOTE — PROGRESS NOTES
Chief Complaint  Flu Vaccine and Med Refill PT PRESENTS TODAY FOR FLU VACCINEE AND FOLLOW UP ON MEDICATION.     SUBJECTIVE  Gerald Escalera presents to [facility]    History of Present Illness  58-year-old Gerald Escalera presents today for a 6-month follow-up.    Patient has a history of hyperlipidemia.  He is currently working on diet and exercise to reduce lipid levels.    Patient does complain of lower back pain and is requesting a order for Mobic.    Discussed with patient that he is borderline diabetic and needs to work on reducing carb intake and sugar intake.    Patient is requesting flu vaccination today.  Past Medical History:   Diagnosis Date    Cervical disc disorder     Complete tear of left rotator cuff     Herniated nucleus pulposus, C6-7     Lumbosacral disc disease     Rotator cuff syndrome     Thoracic disc disorder       Family History   Problem Relation Age of Onset    Malig Hyperthermia Neg Hx       Past Surgical History:   Procedure Laterality Date    ANTERIOR CERVICAL DISCECTOMY W/ FUSION Right 2/20/2023    Procedure: ANTERIOR CERVICAL DISCECTOMY AND FUSION USING ALLOGRAFT BONE AND INSTRUMENTATION, right approach, cervical 5-cervical 6 and cervical 6-cervical 7;  Surgeon: Layo Kirkpatrick MD;  Location: [facility] MAIN OR;  Service: Neurosurgery;  Laterality: Right;    CHOLECYSTECTOMY      ROTATOR CUFF REPAIR Right     SHOULDER ARTHROSCOPY W/ ROTATOR CUFF REPAIR Left 11/15/2021    Procedure: LEFT SHOULDER ARTHROSCOPyY , MINI ROTATOR CUFF REPAIR,SUBACROMINAL DECOMPRESSION,  BICEPS TENDODESIS;  Surgeon: Jorgito Marquez MD;  [place] [facility] [place] [facility];  Service: Orthopedics;  Laterality: Left;        Current Outpatient Medications:     Semaglutide-Weight Management (Wegovy) 2.4 MG/0.75ML solution auto-injector, Inject 2.4 mg under the skin into the appropriate area as directed 1 (One) Time Per Week for 30 days., Disp: 3 mL, Rfl: 3    meloxicam (Mobic) 7.5 MG tablet, Take  "1 tablet by mouth Daily for 180 days., Disp: 90 tablet, Rfl: 1    OBJECTIVE  Vital Signs:   /74   Pulse 77   Ht 180.3 cm (71\")   Wt 135 kg (297 lb)   SpO2 97%   BMI 41.42 kg/m²    Estimated body mass index is 41.42 kg/m² as calculated from the following:    Height as of this encounter: 180.3 cm (71\").    Weight as of this encounter: 135 kg (297 lb).     Wt Readings from Last 3 Encounters:   09/19/23 135 kg (297 lb)   08/16/23 (!) 137 kg (302 lb)   05/16/23 (!) 141 kg (311 lb)     BP Readings from Last 3 Encounters:   09/19/23 131/74   08/16/23 121/80   05/16/23 126/81       Physical Exam  Vitals and nursing note reviewed.   Constitutional:       Appearance: Normal appearance. He is obese.   HENT:      Head: Normocephalic and atraumatic.      Nose: Nose normal.      Mouth/Throat:      Mouth: Mucous membranes are moist.   Eyes:      Conjunctiva/sclera: Conjunctivae normal.      Pupils: Pupils are equal, round, and reactive to light.   Cardiovascular:      Rate and Rhythm: Normal rate and regular rhythm.      Pulses: Normal pulses.      Heart sounds: Normal heart sounds.   Pulmonary:      Effort: Pulmonary effort is normal.      Breath sounds: Normal breath sounds.   Abdominal:      General: Abdomen is flat.      Palpations: Abdomen is soft.   Musculoskeletal:         General: Normal range of motion.      Cervical back: Normal range of motion and neck supple.   Skin:     General: Skin is warm and dry.   Neurological:      General: No focal deficit present.      Mental Status: He is alert and oriented to person, place, and time. Mental status is at baseline.   Psychiatric:         Mood and Affect: Mood normal.         Behavior: Behavior normal.         Thought Content: Thought content normal.         Judgment: Judgment normal.        Result Review        XR Spine Cervical Complete 4 or 5 View    Result Date: 8/15/2023    1. No acute osseous process identified. 2. Changes from C5-C7 ACDF.  Hardware appears " intact.     CONRADO MARROQUIN MD       Electronically Signed and Approved By: CONRADO MARROQUIN MD on 8/15/2023 at 5:45                 The above data has been reviewed by ISMAEL Merino 09/19/2023 09:21 EDT.          Patient Care Team:  Yen Santo APRN as PCP - General (Emergency Medicine)            ASSESSMENT & PLAN    Diagnoses and all orders for this visit:    1. Chronic midline low back pain, unspecified whether sciatica present (Primary)  Comments:  Have ordered meloxicam 7.5 mg daily for patient to take for lower back pain.    2. Class 3 severe obesity due to excess calories without serious comorbidity with body mass index (BMI) of 40.0 to 44.9 in adult  Comments:  Patient will continue to work on diet and exercise.  He is also on Wegovy and have refilled this for him at this time.    3. Need for influenza vaccination  Comments:  Will receive in office today.  Orders:  -     Fluzone (or Fluarix & Flulaval for VFC) >6mos    Other orders  -     meloxicam (Mobic) 7.5 MG tablet; Take 1 tablet by mouth Daily for 180 days.  Dispense: 90 tablet; Refill: 1  -     Semaglutide-Weight Management (Wegovy) 2.4 MG/0.75ML solution auto-injector; Inject 2.4 mg under the skin into the appropriate area as directed 1 (One) Time Per Week for 30 days.  Dispense: 3 mL; Refill: 3         Tobacco Use: Low Risk     Smoking Tobacco Use: Never    Smokeless Tobacco Use: Never    Passive Exposure: Not on file       Follow Up     No follow-ups on file.      Patient was given instructions and counseling regarding his condition or for health maintenance advice. Please see specific information pulled into the AVS if appropriate.   I have reviewed information obtained and documented by others and I have confirmed the accuracy of this documented note.    ISMAEL Merino

## 2023-09-19 NOTE — PROGRESS NOTES
Total cholesterol, triglycerides and LDL are elevated.  Work on diet and exercise.  Reduction of trans fats and saturated fats.  Increase in omega-3 fatty acids.  A1c is 5.8% which does mean borderline diabetic work on reduction of carbohydrates.  All the labs are nonconcerning at this time.

## 2023-09-22 RX ORDER — SEMAGLUTIDE 2.4 MG/.75ML
2.4 INJECTION, SOLUTION SUBCUTANEOUS WEEKLY
Qty: 3 ML | Refills: 3 | OUTPATIENT
Start: 2023-09-22 | End: 2023-10-22

## 2023-10-16 RX ORDER — SEMAGLUTIDE 2.4 MG/.75ML
2.4 INJECTION, SOLUTION SUBCUTANEOUS WEEKLY
Qty: 3 ML | Refills: 3 | Status: SHIPPED | OUTPATIENT
Start: 2023-10-16 | End: 2023-11-15

## 2023-11-10 ENCOUNTER — HOSPITAL ENCOUNTER (OUTPATIENT)
Dept: GENERAL RADIOLOGY | Facility: HOSPITAL | Age: 58
Discharge: HOME | End: 2023-11-10
Admitting: PHYSICIAN ASSISTANT
Payer: COMMERCIAL

## 2023-11-10 DIAGNOSIS — Z98.1 STATUS POST CERVICAL SPINAL FUSION: ICD-10-CM

## 2023-11-10 DIAGNOSIS — M50.223 HERNIATED NUCLEUS PULPOSUS, C6-7: ICD-10-CM

## 2023-11-10 PROCEDURE — 72050 X-RAY EXAM NECK SPINE 4/5VWS: CPT

## 2023-11-13 RX ORDER — SEMAGLUTIDE 2.4 MG/.75ML
2.4 INJECTION, SOLUTION SUBCUTANEOUS WEEKLY
Qty: 3 ML | Refills: 3 | Status: SHIPPED | OUTPATIENT
Start: 2023-11-13 | End: 2023-12-13

## 2023-11-16 ENCOUNTER — OFFICE VISIT (OUTPATIENT)
Dept: NEUROSURGERY | Facility: CLINIC | Age: 58
End: 2023-11-16
Payer: COMMERCIAL

## 2023-11-16 VITALS
SYSTOLIC BLOOD PRESSURE: 113 MMHG | HEIGHT: 71 IN | DIASTOLIC BLOOD PRESSURE: 69 MMHG | WEIGHT: 294.8 LBS | BODY MASS INDEX: 41.27 KG/M2 | HEART RATE: 82 BPM

## 2023-11-16 DIAGNOSIS — M50.223 HERNIATED NUCLEUS PULPOSUS, C6-7: Primary | ICD-10-CM

## 2023-11-16 DIAGNOSIS — Z98.1 STATUS POST CERVICAL SPINAL FUSION: ICD-10-CM

## 2023-11-16 NOTE — PROGRESS NOTES
"Patient being seen for today for Follow-up (3 month f/u)  .    Subjective    Gerald Escalera is a 58 y.o. male that presents with Follow-up (3 month f/u)  .    HPI  Previously: Last seen on 8/16/2023 status post ACDF at C5-C6 and C6-C7 on 2/20/2023.  He was doing well at that time.  He was going to monitor for worsening or new complaints.  There was a plan to have x-ray and follow-up in 3 months to monitor the ACDF at C5-C7.    Today: He denies any significant pain complaints at this point. It gets \"tired\" at times.    He denies anything new or changed.     reports that he has never smoked. He has never used smokeless tobacco.    Review of Systems   Musculoskeletal:  Negative for neck pain.       Objective   Vitals:    11/16/23 1332   BP: 113/69   Pulse: 82        Physical Exam  Constitutional:       Appearance: Normal appearance. He is obese.   Pulmonary:      Effort: Pulmonary effort is normal.   Musculoskeletal:         General: No tenderness.      Comments: Jarrett's negative bilaterally   Neurological:      General: No focal deficit present.      Mental Status: He is alert and oriented to person, place, and time.      Sensory: No sensory deficit.      Motor: No weakness.      Deep Tendon Reflexes: Reflexes normal.   Psychiatric:         Mood and Affect: Mood normal.         Behavior: Behavior normal.          Result Review   I have personally reviewed the x-ray of cervical spine from 11/10/2023 which shows stable ACDF at C5-C7.     Assessment and Plan {CC Problem List  Visit Diagnosis  ROS  Review (Popup)  Health Maintenance  Quality  BestPractice  Medications  SmartSets  SnapShot Encounters  Media :23}   Diagnoses and all orders for this visit:    1. Herniated nucleus pulposus, C6-7 (Primary)  -     XR Spine Cervical Complete 4 or 5 View; Future    2. Status post cervical spinal fusion  -     XR Spine Cervical Complete 4 or 5 View; Future    He is doing well.    He will continue to monitor for new " or worsening symptoms and notify us of change.    He will follow-up in 3 months with one last x-ray of the cervical spine to monitor the C5-C7 fusion.  Follow Up {Instructions Charge Capture  Follow-up Communications :23}   Return in about 3 months (around 2/16/2024).

## 2023-12-11 RX ORDER — SEMAGLUTIDE 2.4 MG/.75ML
2.4 INJECTION, SOLUTION SUBCUTANEOUS WEEKLY
Qty: 3 ML | Refills: 3 | Status: SHIPPED | OUTPATIENT
Start: 2023-12-11 | End: 2024-01-10

## 2024-01-08 RX ORDER — SEMAGLUTIDE 2.4 MG/.75ML
2.4 INJECTION, SOLUTION SUBCUTANEOUS WEEKLY
Qty: 3 ML | Refills: 3 | Status: SHIPPED | OUTPATIENT
Start: 2024-01-08 | End: 2024-02-07

## 2024-02-05 RX ORDER — SEMAGLUTIDE 2.4 MG/.75ML
2.4 INJECTION, SOLUTION SUBCUTANEOUS WEEKLY
Qty: 3 ML | Refills: 3 | Status: SHIPPED | OUTPATIENT
Start: 2024-02-05 | End: 2024-03-06

## 2024-02-15 ENCOUNTER — HOSPITAL ENCOUNTER (OUTPATIENT)
Dept: GENERAL RADIOLOGY | Facility: HOSPITAL | Age: 59
Discharge: HOME | End: 2024-02-15
Admitting: PHYSICIAN ASSISTANT
Payer: COMMERCIAL

## 2024-02-15 DIAGNOSIS — M50.223 HERNIATED NUCLEUS PULPOSUS, C6-7: ICD-10-CM

## 2024-02-15 DIAGNOSIS — Z98.1 STATUS POST CERVICAL SPINAL FUSION: ICD-10-CM

## 2024-02-15 PROCEDURE — 72050 X-RAY EXAM NECK SPINE 4/5VWS: CPT

## 2024-02-16 ENCOUNTER — OFFICE VISIT (OUTPATIENT)
Dept: NEUROSURGERY | Facility: CLINIC | Age: 59
End: 2024-02-16
Payer: COMMERCIAL

## 2024-02-16 VITALS
SYSTOLIC BLOOD PRESSURE: 118 MMHG | HEIGHT: 71 IN | WEIGHT: 290 LBS | BODY MASS INDEX: 40.6 KG/M2 | DIASTOLIC BLOOD PRESSURE: 75 MMHG | HEART RATE: 84 BPM

## 2024-02-16 DIAGNOSIS — M50.223 HERNIATED NUCLEUS PULPOSUS, C6-7: Primary | ICD-10-CM

## 2024-02-16 DIAGNOSIS — M54.2 CERVICALGIA: ICD-10-CM

## 2024-02-16 DIAGNOSIS — Z98.1 STATUS POST CERVICAL SPINAL FUSION: ICD-10-CM

## 2024-03-04 RX ORDER — SEMAGLUTIDE 2.4 MG/.75ML
2.4 INJECTION, SOLUTION SUBCUTANEOUS WEEKLY
Qty: 3 ML | Refills: 3 | OUTPATIENT
Start: 2024-03-04 | End: 2024-04-03

## 2024-03-04 NOTE — DISCHARGE INSTRUCTIONS
DISCHARGE INSTRUCTIONS  Post-Operative  Arthroscopic Shoulder Surgery      • For your surgery you had:  ? General anesthesia (you may have a sore throat for the first 24 hours)  ? You received an anesthesia medication today that can cause hormonal forms of birth control to be ineffective. You should use a different form of birth control (to prevent pregnancy) for 7 days.   ? IV sedation.  ? Local anesthesia  ? Monitored anesthesia care  • You may experience dizziness, drowsiness, or light-headedness for several hours following surgery/procedure.  • Do not stay alone today or tonight.  • Limit your activity for 24 hours.  • Resume your diet slowly.  Follow whatever special dietary instructions you may have been given by your doctor.  • You should not drive or operate machinery or drink alcohol for 24 hours or while you are taking pain medication.  • You should not sign legally binding documents.  • Post-Operative Day #1 is counted as the 1st day after surgery.  [x] If you had a regional block, you will not have control of your arm for up to 12 hours.  Protect the arm with a sling or follow your physician's specific instructions.  Post-Operative Day #2  • Remove your dressing.  Under the dressing you will either have sutures or staples.  Change dressing once or twice daily.  Place a dry dressing or band-aids over the small incisions.  • Prior to dressing change, wash your hands.  Post-Operative Day #2  • You may shower.  During the shower, you may let the water hit the incision and roll down but do not scrub or place any soap on the incision.  Do not soak it.  Pat it dry and do not put any ointments on the incision unless directed by surgeon. Then replace the dry dressing.    General Instructions  ? The Cold Therapy System can help reduce swelling and decrease pain.  Utilize device for 60 minutes per session, with 60 minute breaks in between sessions.  It is recommended to use, as directed, for the first 72 hours  after surgery until bedtime.  After 72 hours, continue using the device as needed until your follow-up appointment with your physician.  Never place directly on skin.  Please refer to the instruction sheet given.  • Keep arm elevated with sling to decrease swelling and minimize throbbing pain.  The sling will provide support for your shoulder.  • It is important to remove the sling 3-5 times daily to work on range-of-motion of the elbow, wrist and hand.  • You will receive a prescription for physical therapy, unless otherwise instructed by physician.  • After most shoulder surgeries, it is permissible to start exercises by slowing trying to crawl your fingers up a wall until your arm is parallel to the floor.  While doing this support the affected arm at the elbow or closer to the shoulder.  You may also lean over and let the arm and hand do small or large circles or write the alphabet with your hanging arm to keep it loose.  It is normal to have some pain with these gentle exercises.  The exercises help reduce swelling and pain overall and help to avoid a stiff shoulder post-operatively.  • It is okay to come out of the sling for showering or for certain activities of daily living but avoid any lifting or carrying with the affected arm until instructed by the physician especially if you have had a repair of the rotator cuff or some type of reconstructive procedure.  • It is okay to come out of the sling and support the arm with a pillow if you remain sedentary.  In general, sling use is preferred following a repair or reconstruction for 4 weeks.  If you have had a SAD (sub acromial decompression), continue sling use more liberally because there was not a repair or reconstruction that could be harmed.          DISCHARGE INSTRUCTIONS  Post-Operative   Arthroscopic Shoulder Surgery      • Exercise fingers for 10 minutes every hour while awake.  • The physician will typically inform the family and the patient whether  2/17 COVID +; 2/25 and 2/26 COVID neg  02-08    135  |  98  |  11.8  ----------------------------<  92  3.7   |  25.0  |  0.65    Ca    9.0      08 Feb 2024 21:15    TPro  7.8  /  Alb  4.1  /  TBili  <0.2<L>  /  DBili  x   /  AST  24  /  ALT  14  /  AlkPhos  68  02-08   or not a repair or reconstruction could be harmed.  • If you have had a rotator cuff repair or reconstructive procedure, do not let the arm drop down suddenly from an elevated position down to your side despite improvements made in pain and over the 3 months following repair and reconstruction.  It generally takes 8-12 weeks before the repair or reconstruction does not rely on sutures and anchors that are placed for the repair.  • You are generally given a prescription for a narcotic medication.  Take as prescribed.  You may use over-the-counter anti-inflammatory medications such as Motrin or Aleve for additional pain or fever reduction if not allergic.  If you are taking the pain medication prescribed, do not take Tylenol too unless you consult with the pharmacist. The pain medications generally have Tylenol in them and too much Tylenol can be harmful.  Sometimes it is recommended to take an anti-inflammatory in between doses of prescription pain medication if additional pain relief is needed.  Consult with your physician or your pharmacist before taking over-the-counter pain medications/anti-inflammatories.  • It is not uncommon to have a fever post-operatively especially in the first 24-48 hours.  Anti-inflammatories can be used for fever reduction also.  • It is normal to have some redness or irritation around skin sutures or staples, however, if you have any expanding areas of redness with a persistent fever and increasing pain notify the physician as soon as possible.  • The incidence of blood clots is low following arthroscopic procedures, however, if you notice any increasing pain or swelling in your calves or legs, contact the physician as soon as possible.   • It is difficult to sleep in the customary fashion following a shoulder surgery.  It is usually necessary to sleep with the shoulder above the level of the heart such as in a recliner, couch or with the head of the bed elevated.  This should slowly  improve over the weeks following shoulder surgery.  • If unable to urinate 6 to 8 hours after surgery or urinating frequently in small amounts, notify the physician or go to the nearest Emergency Room.  SPECIAL INSTRUCTIONS:        NOTIFY YOUR PHYSICIAN IF YOU EXPERIENCE:  1. Numbness of fingers.  2. Inability to move fingers.  3. Extreme coldness, paleness or blue dis-coloration of fingers.  4. Any pain other than from the incision, such as swelling of the arm that blocks circulation of fingers.  • Follow verbal instructions from your doctor.  • Medications per physician instructions as indicated on Discharge Medication Information Sheet.  You should see  ______________________for follow-up care  on     Phone number: __________________________________  • Missing your appointment/follow-up could lead to serious complications  • If you have an emergency and cannot reach your doctor, go to an Emergency Room nearest your home.

## 2024-05-28 ENCOUNTER — TELEPHONE (OUTPATIENT)
Dept: FAMILY MEDICINE CLINIC | Facility: CLINIC | Age: 59
End: 2024-05-28
Payer: COMMERCIAL

## 2024-05-28 RX ORDER — SEMAGLUTIDE 2.4 MG/.75ML
2.4 INJECTION, SOLUTION SUBCUTANEOUS WEEKLY
Qty: 3 ML | Refills: 3 | OUTPATIENT
Start: 2024-05-28 | End: 2024-06-27

## 2024-05-28 NOTE — TELEPHONE ENCOUNTER
Caller: Gerald Escalera    Relationship: Self    Best call back number: [phone]    Requested Prescriptions:   Requested Prescriptions      No prescriptions requested or ordered in this encounter    Semaglutide-Weight Management (Wegovy) 2.4 MG/0.75ML solution auto-injector ()     Pharmacy where request should be sent: [facility] [id number]  [place], [place] - [address] AT [facility] [place] [address]  [phone] [facility] [phone]      Last office visit with prescribing clinician: 2023   Last telemedicine visit with prescribing clinician: Visit date not found   Next office visit with prescribing clinician: Visit date not found     Additional details provided by patient: PATIENT TOOK HIS LAST SHOT THIS PAST WEEKEND. PLEASE SEND NEW PRESCRIPTION TO PHARMACY ASAP.    Does the patient have less than a 3 day supply:  [x] Yes  [] No    Would you like a call back once the refill request has been completed: [] Yes [] No    If the office needs to give you a call back, can they leave a voicemail: [] Yes [] No    Reyes Wall Rep   24 10:29 EDT

## 2024-05-29 NOTE — TELEPHONE ENCOUNTER
HUB to RELAY    Provider is no longer at clinic, please call office to establish care with another provider.

## 2024-10-23 ENCOUNTER — PRIOR AUTHORIZATION (OUTPATIENT)
Dept: INTERNAL MEDICINE | Facility: CLINIC | Age: 59
End: 2024-10-23

## 2024-10-23 ENCOUNTER — OFFICE VISIT (OUTPATIENT)
Dept: INTERNAL MEDICINE | Facility: CLINIC | Age: 59
End: 2024-10-23
Payer: COMMERCIAL

## 2024-10-23 VITALS
OXYGEN SATURATION: 96 % | DIASTOLIC BLOOD PRESSURE: 76 MMHG | TEMPERATURE: 96.8 F | WEIGHT: 291.8 LBS | BODY MASS INDEX: 40.7 KG/M2 | SYSTOLIC BLOOD PRESSURE: 120 MMHG | HEART RATE: 82 BPM

## 2024-10-23 DIAGNOSIS — M54.50 CHRONIC MIDLINE LOW BACK PAIN, UNSPECIFIED WHETHER SCIATICA PRESENT: ICD-10-CM

## 2024-10-23 DIAGNOSIS — E78.2 MODERATE MIXED HYPERLIPIDEMIA NOT REQUIRING STATIN THERAPY: ICD-10-CM

## 2024-10-23 DIAGNOSIS — Z12.5 PROSTATE CANCER SCREENING: ICD-10-CM

## 2024-10-23 DIAGNOSIS — Z76.89 ESTABLISHING CARE WITH NEW DOCTOR, ENCOUNTER FOR: Primary | ICD-10-CM

## 2024-10-23 DIAGNOSIS — Z23 NEED FOR INFLUENZA VACCINATION: ICD-10-CM

## 2024-10-23 DIAGNOSIS — Z13.29 THYROID DISORDER SCREEN: ICD-10-CM

## 2024-10-23 DIAGNOSIS — G89.29 CHRONIC MIDLINE LOW BACK PAIN, UNSPECIFIED WHETHER SCIATICA PRESENT: ICD-10-CM

## 2024-10-23 DIAGNOSIS — R73.03 PREDIABETES: ICD-10-CM

## 2024-10-23 DIAGNOSIS — E66.813 CLASS 3 SEVERE OBESITY DUE TO EXCESS CALORIES WITHOUT SERIOUS COMORBIDITY WITH BODY MASS INDEX (BMI) OF 40.0 TO 44.9 IN ADULT: ICD-10-CM

## 2024-10-23 DIAGNOSIS — E66.01 CLASS 3 SEVERE OBESITY DUE TO EXCESS CALORIES WITHOUT SERIOUS COMORBIDITY WITH BODY MASS INDEX (BMI) OF 40.0 TO 44.9 IN ADULT: ICD-10-CM

## 2024-10-23 DIAGNOSIS — Z00.00 ANNUAL PHYSICAL EXAM: ICD-10-CM

## 2024-10-23 PROCEDURE — 90656 IIV3 VACC NO PRSV 0.5 ML IM: CPT | Performed by: NURSE PRACTITIONER

## 2024-10-23 PROCEDURE — 99214 OFFICE O/P EST MOD 30 MIN: CPT | Performed by: NURSE PRACTITIONER

## 2024-10-23 PROCEDURE — 99396 PREV VISIT EST AGE 40-64: CPT | Performed by: NURSE PRACTITIONER

## 2024-10-23 PROCEDURE — 90471 IMMUNIZATION ADMIN: CPT | Performed by: NURSE PRACTITIONER

## 2024-10-23 RX ORDER — SEMAGLUTIDE 2.4 MG/.75ML
2.4 INJECTION, SOLUTION SUBCUTANEOUS WEEKLY
Qty: 3 ML | Refills: 3 | Status: CANCELLED | OUTPATIENT
Start: 2024-10-23 | End: 2024-11-22

## 2024-10-23 NOTE — ASSESSMENT & PLAN NOTE
Continue to treat as needed, try to use Tylenol primarily, could consider short-term use of NSAIDs, but will try to avoid if possible.

## 2024-10-23 NOTE — TELEPHONE ENCOUNTER
Zepbound 5MG/0.5ML pen-injectors    The authorization is valid from 09/23/2024 through 04/21/2025. A letter of explanation will also be  mailed to the patient.

## 2024-10-23 NOTE — PROGRESS NOTES
Chief Complaint  Annual Exam and Establish Care    Subjective        Gerald Ecsalera [place] [facility] for establishment of care.  Patient is transferring care from another [facility] primary care office, his previous provider has left the practice.  Patient has not been seen since September 2023 by his PCP.  He is due for annual checkup, labs.  He does have known hyperlipidemia, not on statin.  Weight has been a concern, he has been on Wegovy now for approximately 18 months.  He has had about 20 to 22 pound weight loss total.  Feels like he has plateaued.  His A1c has been in the prediabetic range, last checked September 2023.  He does suffer from some chronic low back pain, he just treats as needed, and is trying to use Tylenol primarily now, he did have recent kidney stone, and he has some decreased kidney function on labs, so trying to avoid NSAIDs if at all possible.  He has status post cervical fusion, currently continues to do well.    Objective   Vital Signs:   /76 (BP Location: Left arm, Patient Position: Sitting, Cuff Size: Large Adult)   Pulse 82   Temp 96.8 °F (36 °C) (Temporal)   Wt 132 kg (291 lb 12.8 oz)   SpO2 96%   BMI 40.70 kg/m²     Physical Exam  Vitals and nursing note reviewed.   Constitutional:       Appearance: Normal appearance. He is obese.   HENT:      Head: Normocephalic and atraumatic.   Cardiovascular:      Rate and Rhythm: Normal rate.   Pulmonary:      Effort: Pulmonary effort is normal.   Neurological:      Mental Status: He is alert.   Psychiatric:         Mood and Affect: Mood normal.         Thought Content: Thought content normal.        Result Review :  {The following data was reviewed by ISMAEL Magallon on 10/23/24                Diagnoses and all orders for this visit:    1. Establishing care with new doctor, encounter for (Primary)    2. Annual physical exam  Assessment & Plan:  Screening labs reviewed/ordered  Counseling  provided regarding age appropriate screenings and immunizations, healthy diet and exercise.       Orders:  -     Comprehensive Metabolic Panel  -     CBC & Differential  -     TSH  -     Lipid Panel    3. Prediabetes  -     Comprehensive Metabolic Panel  -     CBC & Differential  -     Hemoglobin A1c    4. Moderate mixed hyperlipidemia not requiring statin therapy  -     Lipid Panel    5. Class 3 severe obesity due to excess calories without serious comorbidity with body mass index (BMI) of 40.0 to 44.9 in adult  Assessment & Plan:  Patient's (Body mass index is 40.7 kg/m².) indicates that they are morbidly/severely obese (BMI > 40 or > 35 with obesity - related health condition) with health conditions that include impaired fasting glucose and dyslipidemias . Weight is unchanged. BMI  is above average; BMI management plan is completed. We discussed portion control and increasing exercise.     Orders:  -     Comprehensive Metabolic Panel  -     CBC & Differential  -     TSH  -     Lipid Panel  -     Hemoglobin A1c  -     Tirzepatide-Weight Management (ZEPBOUND) 5 MG/0.5ML solution auto-injector; Inject 0.5 mL under the skin into the appropriate area as directed 1 (One) Time Per Week.  Dispense: 2 mL; Refill: 0    6. Thyroid disorder screen  -     TSH    7. Prostate cancer screening  -     PSA Screen    8. Chronic midline low back pain, unspecified whether sciatica present  Assessment & Plan:  Continue to treat as needed, try to use Tylenol primarily, could consider short-term use of NSAIDs, but will try to avoid if possible.      9. Need for influenza vaccination  -     Fluzone >6mos          Follow Up   Return in about 3 months (around 1/23/2025) for Recheck.  Patient was given instructions and counseling regarding his condition or for health maintenance advice. Please see specific information pulled into the AVS if appropriate.     Danilo Giron, ISMAEL  10/23/2024  This note was electronically signed.

## 2024-10-23 NOTE — ASSESSMENT & PLAN NOTE
Patient's (Body mass index is 40.7 kg/m².) indicates that they are morbidly/severely obese (BMI > 40 or > 35 with obesity - related health condition) with health conditions that include impaired fasting glucose and dyslipidemias . Weight is unchanged. BMI  is above average; BMI management plan is completed. We discussed portion control and increasing exercise.

## 2024-10-28 RX ORDER — SEMAGLUTIDE 2.4 MG/.75ML
2.4 INJECTION, SOLUTION SUBCUTANEOUS WEEKLY
Qty: 9 ML | Refills: 0 | Status: SHIPPED | OUTPATIENT
Start: 2024-10-28

## 2024-10-30 ENCOUNTER — PRIOR AUTHORIZATION (OUTPATIENT)
Dept: INTERNAL MEDICINE | Facility: CLINIC | Age: 59
End: 2024-10-30
Payer: COMMERCIAL

## 2024-10-30 NOTE — TELEPHONE ENCOUNTER
Wegovy 2.4MG/0.75ML auto-injectors    The authorization is valid from 09/29/2024 through 04/27/2025. A letter of explanation will also be  mailed to the patient.

## 2024-11-11 ENCOUNTER — LAB (OUTPATIENT)
Dept: LAB | Facility: HOSPITAL | Age: 59
End: 2024-11-11
Payer: COMMERCIAL

## 2024-11-11 LAB
ALBUMIN SERPL-MCNC: 4.5 G/DL (ref 3.5–5.2)
ALBUMIN/GLOB SERPL: 1.7 G/DL
ALP SERPL-CCNC: 64 U/L (ref 39–117)
ALT SERPL W P-5'-P-CCNC: 21 U/L (ref 1–41)
ANION GAP SERPL CALCULATED.3IONS-SCNC: 9 MMOL/L (ref 5–15)
AST SERPL-CCNC: 23 U/L (ref 1–40)
BASOPHILS # BLD AUTO: 0.08 10*3/MM3 (ref 0–0.2)
BASOPHILS NFR BLD AUTO: 1.3 % (ref 0–1.5)
BILIRUB SERPL-MCNC: 0.7 MG/DL (ref 0–1.2)
BUN SERPL-MCNC: 13 MG/DL (ref 6–20)
BUN/CREAT SERPL: 10.5 (ref 7–25)
CALCIUM SPEC-SCNC: 9.9 MG/DL (ref 8.6–10.5)
CHLORIDE SERPL-SCNC: 102 MMOL/L (ref 98–107)
CHOLEST SERPL-MCNC: 246 MG/DL (ref 0–200)
CO2 SERPL-SCNC: 26 MMOL/L (ref 22–29)
CREAT SERPL-MCNC: 1.24 MG/DL (ref 0.76–1.27)
DEPRECATED RDW RBC AUTO: 38.3 FL (ref 37–54)
EGFRCR SERPLBLD CKD-EPI 2021: 67 ML/MIN/1.73
EOSINOPHIL # BLD AUTO: 0.14 10*3/MM3 (ref 0–0.4)
EOSINOPHIL NFR BLD AUTO: 2.2 % (ref 0.3–6.2)
ERYTHROCYTE [DISTWIDTH] IN BLOOD BY AUTOMATED COUNT: 12.1 % (ref 12.3–15.4)
GLOBULIN UR ELPH-MCNC: 2.7 GM/DL
GLUCOSE SERPL-MCNC: 101 MG/DL (ref 65–99)
HBA1C MFR BLD: 5.7 % (ref 4.8–5.6)
HCT VFR BLD AUTO: 45.2 % (ref 37.5–51)
HDLC SERPL-MCNC: 39 MG/DL (ref 40–60)
HGB BLD-MCNC: 15.9 G/DL (ref 13–17.7)
IMM GRANULOCYTES # BLD AUTO: 0.03 10*3/MM3 (ref 0–0.05)
IMM GRANULOCYTES NFR BLD AUTO: 0.5 % (ref 0–0.5)
LDLC SERPL CALC-MCNC: 192 MG/DL (ref 0–100)
LDLC/HDLC SERPL: 4.86 {RATIO}
LYMPHOCYTES # BLD AUTO: 1.48 10*3/MM3 (ref 0.7–3.1)
LYMPHOCYTES NFR BLD AUTO: 23.3 % (ref 19.6–45.3)
MCH RBC QN AUTO: 30.8 PG (ref 26.6–33)
MCHC RBC AUTO-ENTMCNC: 35.2 G/DL (ref 31.5–35.7)
MCV RBC AUTO: 87.4 FL (ref 79–97)
MONOCYTES # BLD AUTO: 0.55 10*3/MM3 (ref 0.1–0.9)
MONOCYTES NFR BLD AUTO: 8.7 % (ref 5–12)
NEUTROPHILS NFR BLD AUTO: 4.07 10*3/MM3 (ref 1.7–7)
NEUTROPHILS NFR BLD AUTO: 64 % (ref 42.7–76)
NRBC BLD AUTO-RTO: 0 /100 WBC (ref 0–0.2)
PLATELET # BLD AUTO: 292 10*3/MM3 (ref 140–450)
PMV BLD AUTO: 9.7 FL (ref 6–12)
POTASSIUM SERPL-SCNC: 4.8 MMOL/L (ref 3.5–5.2)
PROT SERPL-MCNC: 7.2 G/DL (ref 6–8.5)
PSA SERPL-MCNC: 0.63 NG/ML (ref 0–4)
RBC # BLD AUTO: 5.17 10*6/MM3 (ref 4.14–5.8)
SODIUM SERPL-SCNC: 137 MMOL/L (ref 136–145)
TRIGL SERPL-MCNC: 87 MG/DL (ref 0–150)
TSH SERPL DL<=0.05 MIU/L-ACNC: 2.95 UIU/ML (ref 0.27–4.2)
VLDLC SERPL-MCNC: 15 MG/DL (ref 5–40)
WBC NRBC COR # BLD AUTO: 6.35 10*3/MM3 (ref 3.4–10.8)

## 2024-11-11 PROCEDURE — 83036 HEMOGLOBIN GLYCOSYLATED A1C: CPT | Performed by: NURSE PRACTITIONER

## 2024-11-11 PROCEDURE — 80061 LIPID PANEL: CPT | Performed by: NURSE PRACTITIONER

## 2024-11-11 PROCEDURE — G0103 PSA SCREENING: HCPCS | Performed by: NURSE PRACTITIONER

## 2024-11-11 PROCEDURE — 80050 GENERAL HEALTH PANEL: CPT | Performed by: NURSE PRACTITIONER

## 2025-01-23 ENCOUNTER — OFFICE VISIT (OUTPATIENT)
Dept: INTERNAL MEDICINE | Facility: CLINIC | Age: 60
End: 2025-01-23
Payer: COMMERCIAL

## 2025-01-23 VITALS
OXYGEN SATURATION: 98 % | WEIGHT: 298.6 LBS | DIASTOLIC BLOOD PRESSURE: 78 MMHG | BODY MASS INDEX: 41.8 KG/M2 | HEART RATE: 67 BPM | RESPIRATION RATE: 16 BRPM | TEMPERATURE: 95.6 F | HEIGHT: 71 IN | SYSTOLIC BLOOD PRESSURE: 122 MMHG

## 2025-01-23 DIAGNOSIS — M54.50 CHRONIC MIDLINE LOW BACK PAIN, UNSPECIFIED WHETHER SCIATICA PRESENT: Primary | ICD-10-CM

## 2025-01-23 DIAGNOSIS — Z79.899 MEDICATION MANAGEMENT: ICD-10-CM

## 2025-01-23 DIAGNOSIS — M51.362 DEGENERATION OF INTERVERTEBRAL DISC OF LUMBAR REGION WITH DISCOGENIC BACK PAIN AND LOWER EXTREMITY PAIN: ICD-10-CM

## 2025-01-23 DIAGNOSIS — Z71.3 WEIGHT LOSS COUNSELING, ENCOUNTER FOR: ICD-10-CM

## 2025-01-23 DIAGNOSIS — E66.01 CLASS 3 SEVERE OBESITY DUE TO EXCESS CALORIES WITHOUT SERIOUS COMORBIDITY WITH BODY MASS INDEX (BMI) OF 40.0 TO 44.9 IN ADULT: ICD-10-CM

## 2025-01-23 DIAGNOSIS — G89.29 CHRONIC MIDLINE LOW BACK PAIN, UNSPECIFIED WHETHER SCIATICA PRESENT: Primary | ICD-10-CM

## 2025-01-23 DIAGNOSIS — E66.813 CLASS 3 SEVERE OBESITY DUE TO EXCESS CALORIES WITHOUT SERIOUS COMORBIDITY WITH BODY MASS INDEX (BMI) OF 40.0 TO 44.9 IN ADULT: ICD-10-CM

## 2025-01-23 PROCEDURE — 99214 OFFICE O/P EST MOD 30 MIN: CPT | Performed by: NURSE PRACTITIONER

## 2025-01-23 NOTE — PROGRESS NOTES
"Chief Complaint  Obesity and Prediabetes    Subjective        Gerald Escalera presents to [facility] for follow-up for prediabetes, obesity, back pain.    Patient reports back pain has been steady and not improving for the last 3 to 4 years.  Looking for recommendations.  Had MRI done in 2023.    Weight loss, has been actually gaining weight on high-dose Wegovy, has had a maximum weight loss of 25 pounds, hovering around 20 now.  Still has significant amount of weight he would like to lose.  Would like to transition to Zepbound as he does have better coverage for this medication.    Objective   Vital Signs:   /78 (BP Location: Left arm, Patient Position: Sitting, Cuff Size: Large Adult)   Pulse 67   Temp 95.6 °F (35.3 °C) (Temporal)   Resp 16   Ht 180.3 cm (70.98\")   Wt 135 kg (298 lb 9.6 oz)   SpO2 98%   BMI 41.67 kg/m²     Physical Exam  Vitals and nursing note reviewed.   Constitutional:       Appearance: Normal appearance.   HENT:      Head: Normocephalic and atraumatic.   Cardiovascular:      Rate and Rhythm: Normal rate.   Pulmonary:      Effort: Pulmonary effort is normal.   Neurological:      Mental Status: He is alert.   Psychiatric:         Mood and Affect: Mood normal.        Result Review :  {The following data was reviewed by ISMAEL Magallon on 01/23/25                Diagnoses and all orders for this visit:    1. Chronic midline low back pain, unspecified whether sciatica present (Primary)  -     Ambulatory Referral to Physical Therapy for Evaluation & Treatment    2. Degeneration of intervertebral disc of lumbar region with discogenic back pain and lower extremity pain  -     Ambulatory Referral to Physical Therapy for Evaluation & Treatment    3. Class 3 severe obesity due to excess calories without serious comorbidity with body mass index (BMI) of 40.0 to 44.9 in adult  -     Tirzepatide-Weight Management (ZEPBOUND) 7.5 MG/0.5ML solution auto-injector; " Inject 0.5 mL under the skin into the appropriate area as directed 1 (One) Time Per Week.  Dispense: 2 mL; Refill: 0    4. Weight loss counseling, encounter for    5. Medication management  -     Tirzepatide-Weight Management (ZEPBOUND) 7.5 MG/0.5ML solution auto-injector; Inject 0.5 mL under the skin into the appropriate area as directed 1 (One) Time Per Week.  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  If there is some improvement with that, he would like to maintain that for now, if not, we may need to go down the route of pain management and injections.  Likely not a surgical option at this point.    Discussed healthy diet, exercise recommendations, but take guidance from physical therapy with his back.  We will start him on moderate dose of Zepbound, he has been on high-dose Wegovy for several months.  Monitor for any side effects, which are quite possible with changing, if they are severe, we can back down, if tolerating well after 4 weeks, will increase to 10 mg and monitor effectiveness of that dose.  Follow-up 3 months for labs      Follow Up   No follow-ups on file.  Patient was given instructions and counseling regarding his condition or for health maintenance advice. Please see specific information pulled into the AVS if appropriate.     ISMAEL Magallon  1/23/2025  This note was electronically signed.

## 2025-01-27 ENCOUNTER — PRIOR AUTHORIZATION (OUTPATIENT)
Dept: INTERNAL MEDICINE | Facility: CLINIC | Age: 60
End: 2025-01-27
Payer: COMMERCIAL

## 2025-01-27 NOTE — TELEPHONE ENCOUNTER
Zepbound 7.5MG/0.5ML pen-injectors    The authorization is valid from 12/25/2024 through 07/23/2025. A letter of explanation will also be  mailed to the patient.

## 2025-03-03 DIAGNOSIS — Z12.11 ENCOUNTER FOR COLORECTAL CANCER SCREENING: Primary | ICD-10-CM

## 2025-03-03 DIAGNOSIS — Z12.12 ENCOUNTER FOR COLORECTAL CANCER SCREENING: Primary | ICD-10-CM

## 2025-03-06 DIAGNOSIS — E66.01 CLASS 3 SEVERE OBESITY DUE TO EXCESS CALORIES WITHOUT SERIOUS COMORBIDITY WITH BODY MASS INDEX (BMI) OF 40.0 TO 44.9 IN ADULT: ICD-10-CM

## 2025-03-06 DIAGNOSIS — E66.813 CLASS 3 SEVERE OBESITY DUE TO EXCESS CALORIES WITHOUT SERIOUS COMORBIDITY WITH BODY MASS INDEX (BMI) OF 40.0 TO 44.9 IN ADULT: ICD-10-CM

## 2025-03-06 DIAGNOSIS — Z79.899 MEDICATION MANAGEMENT: ICD-10-CM

## 2025-03-07 NOTE — TELEPHONE ENCOUNTER
The original prescription was discontinued on 3/3/2025 by Danilo Giron APRN for the following reason: *Therapy completed. Renewing this prescription may not be appropriate.

## 2025-03-10 RX ORDER — TIRZEPATIDE 7.5 MG/.5ML
INJECTION, SOLUTION SUBCUTANEOUS
Qty: 2 ML | Refills: 0 | OUTPATIENT
Start: 2025-03-10

## 2025-04-14 ENCOUNTER — LAB (OUTPATIENT)
Dept: LAB | Facility: HOSPITAL | Age: 60
End: 2025-04-14
Payer: COMMERCIAL

## 2025-04-14 ENCOUNTER — TRANSCRIBE ORDERS (OUTPATIENT)
Dept: LAB | Facility: HOSPITAL | Age: 60
End: 2025-04-14
Payer: COMMERCIAL

## 2025-04-14 DIAGNOSIS — Z51.81 ENCOUNTER FOR THERAPEUTIC DRUG MONITORING: ICD-10-CM

## 2025-04-14 DIAGNOSIS — Z51.81 ENCOUNTER FOR THERAPEUTIC DRUG MONITORING: Primary | ICD-10-CM

## 2025-04-14 LAB
ALBUMIN SERPL-MCNC: 4.2 G/DL (ref 3.5–5.2)
ALBUMIN/GLOB SERPL: 1.6 G/DL
ALP SERPL-CCNC: 62 U/L (ref 39–117)
ALT SERPL W P-5'-P-CCNC: 23 U/L (ref 1–41)
ANION GAP SERPL CALCULATED.3IONS-SCNC: 10 MMOL/L (ref 5–15)
AST SERPL-CCNC: 30 U/L (ref 1–40)
BILIRUB SERPL-MCNC: 0.5 MG/DL (ref 0–1.2)
BUN SERPL-MCNC: 16 MG/DL (ref 6–20)
BUN/CREAT SERPL: 15 (ref 7–25)
CALCIUM SPEC-SCNC: 9.7 MG/DL (ref 8.6–10.5)
CHLORIDE SERPL-SCNC: 105 MMOL/L (ref 98–107)
CO2 SERPL-SCNC: 24 MMOL/L (ref 22–29)
CREAT SERPL-MCNC: 1.07 MG/DL (ref 0.76–1.27)
EGFRCR SERPLBLD CKD-EPI 2021: 79.9 ML/MIN/1.73
GLOBULIN UR ELPH-MCNC: 2.7 GM/DL
GLUCOSE SERPL-MCNC: 118 MG/DL (ref 65–99)
POTASSIUM SERPL-SCNC: 4.1 MMOL/L (ref 3.5–5.2)
PROT SERPL-MCNC: 6.9 G/DL (ref 6–8.5)
SODIUM SERPL-SCNC: 139 MMOL/L (ref 136–145)
URATE SERPL-MCNC: 6.5 MG/DL (ref 3.4–7)

## 2025-04-14 PROCEDURE — 80053 COMPREHEN METABOLIC PANEL: CPT

## 2025-04-14 PROCEDURE — 84550 ASSAY OF BLOOD/URIC ACID: CPT

## 2025-04-14 PROCEDURE — 36415 COLL VENOUS BLD VENIPUNCTURE: CPT

## 2025-05-01 ENCOUNTER — PRIOR AUTHORIZATION (OUTPATIENT)
Dept: INTERNAL MEDICINE | Facility: CLINIC | Age: 60
End: 2025-05-01
Payer: COMMERCIAL

## 2025-05-01 DIAGNOSIS — E66.813 CLASS 3 SEVERE OBESITY DUE TO EXCESS CALORIES WITHOUT SERIOUS COMORBIDITY WITH BODY MASS INDEX (BMI) OF 40.0 TO 44.9 IN ADULT: Primary | ICD-10-CM

## 2025-05-01 DIAGNOSIS — E66.01 CLASS 3 SEVERE OBESITY DUE TO EXCESS CALORIES WITHOUT SERIOUS COMORBIDITY WITH BODY MASS INDEX (BMI) OF 40.0 TO 44.9 IN ADULT: Primary | ICD-10-CM

## 2025-05-01 RX ORDER — SEMAGLUTIDE 1 MG/.5ML
1 INJECTION, SOLUTION SUBCUTANEOUS WEEKLY
Qty: 2 ML | Refills: 0 | Status: SHIPPED | OUTPATIENT
Start: 2025-05-01

## 2025-05-01 NOTE — TELEPHONE ENCOUNTER
Wegovy 1MG/0.5ML auto-injectors    The authorization is valid from 04/01/2025 through 10/28/2025. A letter of explanation will also be  mailed to the patient.

## (undated) DEVICE — SLV SCD LEG COMFORT KENDALLSCD MD REPROC

## (undated) DEVICE — BLD CUT FORMLA AGGR PLS 5.0MM

## (undated) DEVICE — PENCL E/S HNDSWCH ROCKR CB

## (undated) DEVICE — GLV SURG SENSICARE SLT PF LF 7.5 STRL

## (undated) DEVICE — SUT VIC 3/0 SH 27IN J416H

## (undated) DEVICE — VAGINAL PREP TRAY: Brand: MEDLINE INDUSTRIES, INC.

## (undated) DEVICE — INTENDED FOR TISSUE SEPARATION, AND OTHER PROCEDURES THAT REQUIRE A SHARP SURGICAL BLADE TO PUNCTURE OR CUT.: Brand: BARD-PARKER ® CARBON RIB-BACK BLADES

## (undated) DEVICE — RETRACTION ASPIRATOR: Brand: FLOWTRIEVER

## (undated) DEVICE — DRSNG GZ PETROLTM XEROFORM CURAD 1X8IN STRL

## (undated) DEVICE — LAMINECTOMY CERVICAL DISC-LF: Brand: MEDLINE INDUSTRIES, INC.

## (undated) DEVICE — COLD THERAPY BLANKET: Brand: DEROYAL

## (undated) DEVICE — COMFORT ARM SLING: Brand: DEROYAL

## (undated) DEVICE — 90-S CRUISE, SUCTION PROBE, NON-BENDABLE, MAX CUT LEVEL 1: Brand: SERFAS ENERGY

## (undated) DEVICE — COLD THERAPY WRAP: Brand: DEROYAL

## (undated) DEVICE — PAD GRND REM POLYHESIVE A/ DISP

## (undated) DEVICE — TOTAL TRAY, 16FR 10ML SIL FOLEY, URN: Brand: MEDLINE

## (undated) DEVICE — GLV SURG SENSICARE PI ORTHO SZ8 LF STRL

## (undated) DEVICE — 3M™ STERI-STRIP™ REINFORCED ADHESIVE SKIN CLOSURES, R1547, 1/2 IN X 4 IN (12 MM X 100 MM), 6 STRIPS/ENVELOPE: Brand: 3M™ STERI-STRIP™

## (undated) DEVICE — FMS FLUID MANAGEMENT SYSTEM INFLOW TUBING (FMS VUE): Brand: FMS

## (undated) DEVICE — NDL HYPO ECLPS SFTY 22G 1 1/2IN

## (undated) DEVICE — SLV SCD KN/LEN ADJ EXPRSS BLENDED MD 1P/U

## (undated) DEVICE — STERILE POLYISOPRENE POWDER-FREE SURGICAL GLOVES WITH EMOLLIENT COATING: Brand: PROTEXIS

## (undated) DEVICE — SHOULDER ARTHROSCOPY-LF: Brand: MEDLINE INDUSTRIES, INC.

## (undated) DEVICE — SUT VIC UD BR COAT 0 CP2 27IN

## (undated) DEVICE — STERILE POLYISOPRENE POWDER-FREE SURGICAL GLOVES: Brand: PROTEXIS

## (undated) DEVICE — SUT VIC 2/0 CT1 36IN

## (undated) DEVICE — SMOKE ATTACHMENT: Brand: VALLEYLAB

## (undated) DEVICE — SUT MNCRYL PLS ANTIB UD 3/0 PS2 27IN

## (undated) DEVICE — SUT PROLN 0 CT1 30IN 8424H

## (undated) DEVICE — UNDYED BRAIDED (POLYGLACTIN 910), SYNTHETIC ABSORBABLE SUTURE: Brand: COATED VICRYL

## (undated) DEVICE — GLV SURG BIOGEL LTX PF 7 1/2

## (undated) DEVICE — GAUZE,SPONGE,4"X4",16PLY,STRL,LF,10/TRAY: Brand: MEDLINE

## (undated) DEVICE — BANDAGE,GAUZE,BULKEE II,4.5"X4.1YD,STRL: Brand: MEDLINE

## (undated) DEVICE — SYR LL TP 10ML STRL

## (undated) DEVICE — SLV DISTRACTION STAR VELCRO XL DISP

## (undated) DEVICE — DISTRACT SCRW 14MM STRL

## (undated) DEVICE — DRP MICROSCP LECIA W/CLEARLENS 137X381CM

## (undated) DEVICE — BUR BRL FORMLA 12FLUT 4MM

## (undated) DEVICE — SUT MNCRYL PLS ANTIB UD 4/0 PS2 18IN

## (undated) DEVICE — TP NDL SCORPION MULTIFIRE

## (undated) DEVICE — GLV SURG SENSICARE PI LF PF 8 GRN STRL

## (undated) DEVICE — SUT ETHLN 3-0 FS118IN 663H

## (undated) DEVICE — GAMMEX® NON-LATEX SIZE 7.5, STERILE NEOPRENE POWDER-FREE SURGICAL GLOVE: Brand: GAMMEX

## (undated) DEVICE — [THREADED CANNULA.  DO NOT RESTERILIZE,  DO NOT USE IF PACKAGE IS DAMAGED]: Brand: DRI-LOK

## (undated) DEVICE — FMS FLUID MANAGEMENT SYSTEM OUTFLOW TUBING WITHOUT ONE-WAY VALVE (FMS VUE OR FMS DUO PLUS): Brand: FMS

## (undated) DEVICE — SOL IRR NACL 0.9PCT 3000ML

## (undated) DEVICE — SHOULDER P.A.D. III: Brand: DEROYAL